# Patient Record
Sex: MALE | Race: WHITE | NOT HISPANIC OR LATINO | Employment: FULL TIME | URBAN - METROPOLITAN AREA
[De-identification: names, ages, dates, MRNs, and addresses within clinical notes are randomized per-mention and may not be internally consistent; named-entity substitution may affect disease eponyms.]

---

## 2018-04-23 ENCOUNTER — OFFICE VISIT (OUTPATIENT)
Dept: INTERNAL MEDICINE | Facility: CLINIC | Age: 58
End: 2018-04-23
Payer: COMMERCIAL

## 2018-04-23 VITALS
HEART RATE: 106 BPM | BODY MASS INDEX: 31.78 KG/M2 | DIASTOLIC BLOOD PRESSURE: 80 MMHG | SYSTOLIC BLOOD PRESSURE: 120 MMHG | TEMPERATURE: 98.6 F | HEIGHT: 71 IN | WEIGHT: 227 LBS | OXYGEN SATURATION: 98 %

## 2018-04-23 DIAGNOSIS — Z00.00 ROUTINE GENERAL MEDICAL EXAMINATION AT A HEALTH CARE FACILITY: Primary | ICD-10-CM

## 2018-04-23 DIAGNOSIS — R53.83 FATIGUE, UNSPECIFIED TYPE: ICD-10-CM

## 2018-04-23 DIAGNOSIS — Z12.5 SCREENING FOR PROSTATE CANCER: ICD-10-CM

## 2018-04-23 DIAGNOSIS — Z12.11 SPECIAL SCREENING FOR MALIGNANT NEOPLASMS, COLON: ICD-10-CM

## 2018-04-23 PROCEDURE — 90471 IMMUNIZATION ADMIN: CPT | Performed by: INTERNAL MEDICINE

## 2018-04-23 PROCEDURE — 99386 PREV VISIT NEW AGE 40-64: CPT | Mod: 25 | Performed by: INTERNAL MEDICINE

## 2018-04-23 PROCEDURE — 90715 TDAP VACCINE 7 YRS/> IM: CPT | Performed by: INTERNAL MEDICINE

## 2018-04-23 NOTE — PROGRESS NOTES
"  SUBJECTIVE:   CC: Miguel Ruiz is an 57 year old male who presents for preventative health visit.     Healthy Habits:    Do you get at least three servings of calcium containing foods daily (dairy, green leafy vegetables, etc.)? {YES/NO, DAIRY INTAKE:622487::\"yes\"}    Amount of exercise or daily activities, outside of work: {AMOUNT EXERCISE:632420}    Problems taking medications regularly {Yes /No default:206511::\"No\"}    Medication side effects: {Yes /No default.:270301::\"No\"}    Have you had an eye exam in the past two years? {YESNOBLANK:001341}    Do you see a dentist twice per year? {YESNOBLANK:829679}    Do you have sleep apnea, excessive snoring or daytime drowsiness?{YESNOBLANK:827038}  {Outside tests to abstract? :776134}     {additional problems to add (Optional):762574}    Today's PHQ-2 Score:   PHQ-2 ( 1999 Pfizer) 4/23/2018   Q1: Little interest or pleasure in doing things 0   Q2: Feeling down, depressed or hopeless 1   PHQ-2 Score 1   Q1: Little interest or pleasure in doing things Not at all   Q2: Feeling down, depressed or hopeless Several days   PHQ-2 Score 1     {PHQ-2 LOOK IN ASSESSMENTS :438031}  Abuse: Current or Past(Physical, Sexual or Emotional)- {YES/NO/NA:446335}  Do you feel safe in your environment - {YES/NO/NA:448285}    Social History   Substance Use Topics     Smoking status: Never Smoker     Smokeless tobacco: Never Used     Alcohol use 0.5 oz/week      Comment: Occasional      If you drink alcohol do you typically have >3 drinks per day or >7 drinks per week? {ETOH :210775}                      Last PSA: No results found for: PSA    Reviewed orders with patient. Reviewed health maintenance and updated orders accordingly - {Yes/No:579193::\"Yes\"}  {Chronicprobdata (Optional):337529}    Reviewed and updated as needed this visit by clinical staff  Tobacco  Allergies  Med Hx  Surg Hx  Fam Hx  Soc Hx        Reviewed and updated as needed this visit by Provider        {HISTORY " "OPTIONS (Optional):500838}    ROS:  {MALE ROS-adult preventive care package:704239::\"C: NEGATIVE for fever, chills, change in weight\",\"I: NEGATIVE for worrisome rashes, moles or lesions\",\"E: NEGATIVE for vision changes or irritation\",\"ENT: NEGATIVE for ear, mouth and throat problems\",\"R: NEGATIVE for significant cough or SOB\",\"CV: NEGATIVE for chest pain, palpitations or peripheral edema\",\"GI: NEGATIVE for nausea, abdominal pain, heartburn, or change in bowel habits\",\" male: negative for dysuria, hematuria, decreased urinary stream, erectile dysfunction, urethral discharge\",\"M: NEGATIVE for significant arthralgias or myalgia\",\"N: NEGATIVE for weakness, dizziness or paresthesias\",\"P: NEGATIVE for changes in mood or affect\"}    OBJECTIVE:   /80  Pulse 106  Temp 98.6  F (37  C) (Oral)  Ht 5' 11\" (1.803 m)  Wt 227 lb (103 kg)  SpO2 98%  BMI 31.66 kg/m2  EXAM:  {Exam Choices:698116}    ASSESSMENT/PLAN:   {Diag Picklist:139318}    COUNSELING:  {MALE COUNSELING MESSAGES:829929::\"Reviewed preventive health counseling, as reflected in patient instructions\"}    {BP Counseling- Complete if BP >= 120/80  (Optional):656448}   reports that he has never smoked. He has never used smokeless tobacco.  {Tobacco Cessation -- Complete if patient is a smoker (Optional):224303}  Estimated body mass index is 31.66 kg/(m^2) as calculated from the following:    Height as of this encounter: 5' 11\" (1.803 m).    Weight as of this encounter: 227 lb (103 kg).   {Weight Management Plan (ACO) Complete if BMI is abnormal-  Ages 18-64  BMI >24.9.  Age 65+ with BMI <23 or >30 (Optional):099941}    Counseling Resources:  ATP IV Guidelines  Pooled Cohorts Equation Calculator  FRAX Risk Assessment  ICSI Preventive Guidelines  Dietary Guidelines for Americans, 2010  USDA's MyPlate  ASA Prophylaxis  Lung CA Screening    Shay G. Nikolov, MD  [facility]"

## 2018-04-23 NOTE — PROGRESS NOTES
SUBJECTIVE:   CC: Miguel Ruiz is an 57 year old male who presents for preventative health visit.     Physical   Annual:     Getting at least 3 servings of Calcium per day::  NO    Bi-annual eye exam::  NO    Dental care twice a year::  NO    Sleep apnea or symptoms of sleep apnea::  Daytime drowsiness and Excessive snoring    Diet::  Regular (no restrictions)    Frequency of exercise::  None    Taking medications regularly::  Yes    Medication side effects::  None    Additional concerns today::  YES              PROBLEMS TO ADD ON...    Concerns for feeling tired, needs to rest, naps.  Has h/o GERD on PPI treatment. symptoms are controlled. No nausea, vomiting, heartburns, bloating.      Today's PHQ-2 Score:   PHQ-2 ( 1999 Pfizer) 4/23/2018   Q1: Little interest or pleasure in doing things 0   Q2: Feeling down, depressed or hopeless 1   PHQ-2 Score 1   Q1: Little interest or pleasure in doing things Not at all   Q2: Feeling down, depressed or hopeless Several days   PHQ-2 Score 1       Abuse: Current or Past(Physical, Sexual or Emotional)- No  Do you feel safe in your environment - Yes    Social History   Substance Use Topics     Smoking status: Never Smoker     Smokeless tobacco: Never Used     Alcohol use 0.5 oz/week      Comment: Occasional     Alcohol Use 4/23/2018   If you drink alcohol do you typically have greater than 3 drinks per day OR greater than 7 drinks per week? No       Last PSA: No results found for: PSA    Reviewed orders with patient. Reviewed health maintenance and updated orders accordingly - Yes  Labs reviewed in EPIC  BP Readings from Last 3 Encounters:   04/23/18 120/80   11/02/12 128/92   08/22/02 110/78    Wt Readings from Last 3 Encounters:   04/23/18 227 lb (103 kg)   08/22/02 204 lb (92.5 kg)   07/26/02 206 lb (93.4 kg)                    Reviewed and updated as needed this visit by clinical staff  Tobacco  Allergies  Meds  Med Hx  Surg Hx  Fam Hx  Soc Hx        Reviewed and  "updated as needed this visit by Provider            Review of Systems  C: NEGATIVE for fever, chills, change in weight  I: NEGATIVE for worrisome rashes, moles or lesions  E: NEGATIVE for vision changes or irritation  ENT: NEGATIVE for ear, mouth and throat problems  R: NEGATIVE for significant cough or SOB  CV: NEGATIVE for chest pain, palpitations or peripheral edema  GI: NEGATIVE for nausea, abdominal pain, heartburn, or change in bowel habits   male: negative for dysuria, hematuria, decreased urinary stream, erectile dysfunction, urethral discharge  M: NEGATIVE for significant arthralgias or myalgia  N: NEGATIVE for weakness, dizziness or paresthesias  P: NEGATIVE for changes in mood or affect    OBJECTIVE:   /80  Pulse 106  Temp 98.6  F (37  C) (Oral)  Ht 5' 11\" (1.803 m)  Wt 227 lb (103 kg)  SpO2 98%  BMI 31.66 kg/m2    Physical Exam  GENERAL: healthy, alert and no distress  EYES: Eyes grossly normal to inspection, PERRL and conjunctivae and sclerae normal  HENT: ear canals and TM's normal, nose and mouth without ulcers or lesions  NECK: no adenopathy, no asymmetry, masses, or scars and thyroid normal to palpation  RESP: lungs clear to auscultation - no rales, rhonchi or wheezes  CV: regular rate and rhythm, normal S1 S2, no S3 or S4, no murmur, click or rub, no peripheral edema and peripheral pulses strong  ABDOMEN: soft, nontender, no hepatosplenomegaly, no masses and bowel sounds normal  MS: no gross musculoskeletal defects noted, no edema  SKIN: no suspicious lesions or rashes  NEURO: Normal strength and tone, mentation intact and speech normal  PSYCH: mentation appears normal, affect normal/bright    ASSESSMENT/PLAN:       ICD-10-CM    1. Routine general medical examination at a health care facility Z00.00 **CBC with platelets FUTURE anytime     **Comprehensive metabolic panel FUTURE anytime     **Prostate spec antigen screen FUTURE anytime     **TSH with free T4 reflex FUTURE anytime     " "**Testosterone Free and Total FUTURE anytime     Lipid panel reflex to direct LDL Fasting     TDAP (ADACEL)   2. Special screening for malignant neoplasms, colon Z12.11 GASTROENTEROLOGY ADULT REF PROCEDURE ONLY   3. Fatigue, unspecified type R53.83 SLEEP EVALUATION & MANAGEMENT REFERRAL - ADULT -[facility] [place]  [phone] (Age 18 and up)     **CBC with platelets FUTURE anytime     **Comprehensive metabolic panel FUTURE anytime     **TSH with free T4 reflex FUTURE anytime     **Testosterone Free and Total FUTURE anytime   4. Screening for prostate cancer Z12.5 **Prostate spec antigen screen FUTURE anytime       COUNSELING:   Reviewed preventive health counseling, as reflected in patient instructions       Regular exercise       Healthy diet/nutrition       Vision screening       Hearing screening       Immunizations    Vaccinated for: TDAP             Colon cancer screening       Prostate cancer screening         reports that he has never smoked. He has never used smokeless tobacco.    Estimated body mass index is 31.66 kg/(m^2) as calculated from the following:    Height as of this encounter: 5' 11\" (1.803 m).    Weight as of this encounter: 227 lb (103 kg).   Weight management plan: Discussed healthy diet and exercise guidelines and patient will follow up in 12 months in clinic to re-evaluate.    Counseling Resources:  ATP IV Guidelines  Pooled Cohorts Equation Calculator  FRAX Risk Assessment  ICSI Preventive Guidelines  Dietary Guidelines for Americans, 2010  USDA's MyPlate  ASA Prophylaxis  Lung CA Screening    Shay Malik MD  [facility]  Answers for HPI/ROS submitted by the patient on 4/23/2018   PHQ-2 Score: 1    "

## 2018-04-23 NOTE — NURSING NOTE
"Chief Complaint   Patient presents with     Rectal Problem     Rectal lump     Physical     Annual Px       Initial /80  Pulse 106  Temp 98.6  F (37  C) (Oral)  Ht 5' 11\" (1.803 m)  Wt 227 lb (103 kg)  SpO2 98%  BMI 31.66 kg/m2 Estimated body mass index is 31.66 kg/(m^2) as calculated from the following:    Height as of this encounter: 5' 11\" (1.803 m).    Weight as of this encounter: 227 lb (103 kg).  Medication Reconciliation: complete   Teagan Lainez MA      "

## 2018-04-23 NOTE — NURSING NOTE
Screening Questionnaire for Adult Immunization    Are you sick today?   No   Do you have allergies to medications, food, a vaccine component or latex?   No   Have you ever had a serious reaction after receiving a vaccination?   No   Do you have a long-term health problem with heart disease, lung disease, asthma, kidney disease, metabolic disease (e.g. diabetes), anemia, or other blood disorder?   No   Do you have cancer, leukemia, HIV/AIDS, or any other immune system problem?   No   In the past 3 months, have you taken medications that affect  your immune system, such as prednisone, other steroids, or anticancer drugs; drugs for the treatment of rheumatoid arthritis, Crohn s disease, or psoriasis; or have you had radiation treatments?   No   Have you had a seizure, or a brain or other nervous system problem?   No   During the past year, have you received a transfusion of blood or blood     products, or been given immune (gamma) globulin or antiviral drug?   No   For women: Are you pregnant or is there a chance you could become        pregnant during the next month?   No   Have you received any vaccinations in the past 4 weeks?   No     Immunization questionnaire answers were all negative.        Per orders of Dr. Malik, injection of Tdap given by Arthur Demarco. Patient instructed to remain in clinic for 15 minutes afterwards, and to report any adverse reaction to me immediately.     Prior to injection verified patient identity using patient's name and date of birth.    Screening performed by Arthur Demarco on 4/23/2018 at 3:33 PM.

## 2018-04-23 NOTE — MR AVS SNAPSHOT
After Visit Summary   4/23/2018    Miguel ROSARIO Ruiz    MRN: [id number]           Patient Information     Date Of Birth          1960        Visit Information        Provider Department      4/23/2018 2:20 PM Shay Malik MD [facility]        Today's Diagnoses     Routine general medical examination at a health care facility    -  1    Special screening for malignant neoplasms, colon        Fatigue, unspecified type        Screening for prostate cancer          Care Instructions      Preventive Health Recommendations  Male Ages 50 - 64    Yearly exam:             See your health care provider every year in order to  o   Review health changes.   o   Discuss preventive care.    o   Review your medicines if your doctor has prescribed any.     Have a cholesterol test every 5 years, or more frequently if you are at risk for high cholesterol/heart disease.     Have a diabetes test (fasting glucose) every three years. If you are at risk for diabetes, you should have this test more often.     Have a colonoscopy at age 50, or have a yearly FIT test (stool test). These exams will check for colon cancer.      Talk with your health care provider about whether or not a prostate cancer screening test (PSA) is right for you.    You should be tested each year for STDs (sexually transmitted diseases), if you re at risk.     Shots: Get a flu shot each year. Get a tetanus shot every 10 years.     Nutrition:    Eat at least 5 servings of fruits and vegetables daily.     Eat whole-grain bread, whole-wheat pasta and brown rice instead of white grains and rice.     Talk to your provider about Calcium and Vitamin D.     Lifestyle    Exercise for at least 150 minutes a week (30 minutes a day, 5 days a week). This will help you control your weight and prevent disease.     Limit alcohol to one drink per day.     No smoking.     Wear sunscreen to prevent skin cancer.     See your dentist every six  months for an exam and cleaning.     See your eye doctor every 1 to 2 years.            Follow-ups after your visit        Additional Services     GASTROENTEROLOGY ADULT REF PROCEDURE ONLY       Last Lab Result: Creatinine (mg/dL)       Date                     Value                 11/01/2012               0.93             ----------  Body mass index is 31.66 kg/(m^2).     Needed:  No  Language:  English    Patient will be contacted to schedule procedure.     Please be aware that coverage of these services is subject to the terms and limitations of your health insurance plan.  Call member services at your health plan with any benefit or coverage questions.  Any procedures must be performed at a [place] facility OR coordinated by your clinic's referral office.    Please bring the following with you to your appointment:    (1) Any X-Rays, CTs or MRIs which have been performed.  Contact the facility where they were done to arrange for  prior to your scheduled appointment.    (2) List of current medications   (3) This referral request   (4) Any documents/labs given to you for this referral            SLEEP EVALUATION & MANAGEMENT REFERRAL - ADULT -[facility]  [phone] (Age 18 and up)       Please be aware that coverage of these services is subject to the terms and limitations of your health insurance plan.  Call member services at your health plan with any benefit or coverage questions.      Please bring the following to your appointment:    >>   List of current medications   >>   This referral request   >>   Any documents/labs given to you for this referral                      Future tests that were ordered for you today     Open Future Orders        Priority Expected Expires Ordered    **Comprehensive metabolic panel FUTURE anytime Routine 4/23/2018 4/23/2019 4/23/2018    **Prostate spec antigen screen FUTURE anytime Routine 4/23/2018 4/23/2019 4/23/2018    **TSH with  "free T4 reflex FUTURE anytime Routine 2018    **Testosterone Free and Total FUTURE anytime Routine 2018    Lipid panel reflex to direct LDL Fasting Routine 2018    **CBC with platelets FUTURE anytime Routine 2018    SLEEP EVALUATION & MANAGEMENT REFERRAL - ADULT -[facility]  [phone] (Age 18 and up) Routine  2019            Who to contact     If you have questions or need follow up information about today's clinic visit or your schedule please contact [facility] directly at [phone].  Normal or non-critical lab and imaging results will be communicated to you by MyChart, letter or phone within 4 business days after the clinic has received the results. If you do not hear from us within 7 days, please contact the clinic through MyChart or phone. If you have a critical or abnormal lab result, we will notify you by phone as soon as possible.  Submit refill requests through [employer] or call your pharmacy and they will forward the refill request to us. Please allow 3 business days for your refill to be completed.          Additional Information About Your Visit        [employer] Information     [employer] lets you send messages to your doctor, view your test results, renew your prescriptions, schedule appointments and more. To sign up, go to [email or web address] . Click on \"Log in\" on the left side of the screen, which will take you to the Welcome page. Then click on \"Sign up Now\" on the right side of the page.     You will be asked to enter the access code listed below, as well as some personal information. Please follow the directions to create your username and password.     Your access code is: [email or web address]  Expires: 2018  3:22 PM     Your access code will  in 90 days. If you need help or a new code, please call your [facility] or " "[phone].        Care EveryWhere ID     This is your Care EveryWhere ID. This could be used by other organizations to access your [place] medical records  [id number]        Your Vitals Were     Pulse Temperature Height Pulse Oximetry BMI (Body Mass Index)       106 98.6  F (37  C) (Oral) 5' 11\" (1.803 m) 98% 31.66 kg/m2        Blood Pressure from Last 3 Encounters:   04/23/18 120/80   11/02/12 128/92   08/22/02 110/78    Weight from Last 3 Encounters:   04/23/18 227 lb (103 kg)   08/22/02 204 lb (92.5 kg)   07/26/02 206 lb (93.4 kg)              We Performed the Following     GASTROENTEROLOGY ADULT REF PROCEDURE ONLY     TDAP (ADACEL)        Primary Care Provider Fax #    Physician No Ref-Primary [phone]       No address on file        Equal Access to Services     [facility] HospitalJAIRON : Hadii ramu Nava, waaxda vane, qaybta kaalmada carlota, aditi chase . So [facility] [phone].    ATENCIÓN: Si habla español, tiene a parekh disposición servicios gratuitos de asistencia lingüística. Llame al [phone].    We comply with applicable federal civil rights laws and [place] laws. We do not discriminate on the basis of race, color, national origin, age, disability, sex, sexual orientation, or gender identity.            Thank you!     Thank you for choosing [facility]  for your care. Our goal is always to provide you with excellent care. Hearing back from our patients is one way we can continue to improve our services. Please take a few minutes to complete the written survey that you may receive in the mail after your visit with us. Thank you!             Your Updated Medication List - Protect others around you: Learn how to safely use, store and throw away your medicines at www.disposemymeds.org.          This list is accurate as of 4/23/18  3:25 PM.  Always use your most recent med list.                   Brand Name Dispense Instructions for use Diagnosis "    PRILOSEC PO      Take by mouth every morning OTC

## 2018-04-27 DIAGNOSIS — Z12.5 SCREENING FOR PROSTATE CANCER: ICD-10-CM

## 2018-04-27 DIAGNOSIS — Z00.00 ROUTINE GENERAL MEDICAL EXAMINATION AT A HEALTH CARE FACILITY: ICD-10-CM

## 2018-04-27 DIAGNOSIS — R53.83 FATIGUE, UNSPECIFIED TYPE: ICD-10-CM

## 2018-04-27 LAB
ALBUMIN SERPL-MCNC: 4.3 G/DL (ref 3.4–5)
ALP SERPL-CCNC: 52 U/L (ref 40–150)
ALT SERPL W P-5'-P-CCNC: 34 U/L (ref 0–70)
ANION GAP SERPL CALCULATED.3IONS-SCNC: 8 MMOL/L (ref 3–14)
AST SERPL W P-5'-P-CCNC: 14 U/L (ref 0–45)
BILIRUB SERPL-MCNC: 0.7 MG/DL (ref 0.2–1.3)
BUN SERPL-MCNC: 12 MG/DL (ref 7–30)
CALCIUM SERPL-MCNC: 9.2 MG/DL (ref 8.5–10.1)
CHLORIDE SERPL-SCNC: 107 MMOL/L (ref 94–109)
CHOLEST SERPL-MCNC: 222 MG/DL
CO2 SERPL-SCNC: 23 MMOL/L (ref 20–32)
CREAT SERPL-MCNC: 1.01 MG/DL (ref 0.66–1.25)
ERYTHROCYTE [DISTWIDTH] IN BLOOD BY AUTOMATED COUNT: 12.8 % (ref 10–15)
GFR SERPL CREATININE-BSD FRML MDRD: 77 ML/MIN/1.7M2
GLUCOSE SERPL-MCNC: 102 MG/DL (ref 70–99)
HCT VFR BLD AUTO: 45.1 % (ref 40–53)
HDLC SERPL-MCNC: 33 MG/DL
HGB BLD-MCNC: 15.6 G/DL (ref 13.3–17.7)
LDLC SERPL CALC-MCNC: 168 MG/DL
MCH RBC QN AUTO: 31.4 PG (ref 26.5–33)
MCHC RBC AUTO-ENTMCNC: 34.6 G/DL (ref 31.5–36.5)
MCV RBC AUTO: 91 FL (ref 78–100)
NONHDLC SERPL-MCNC: 189 MG/DL
PLATELET # BLD AUTO: 271 10E9/L (ref 150–450)
POTASSIUM SERPL-SCNC: 4.5 MMOL/L (ref 3.4–5.3)
PROT SERPL-MCNC: 8 G/DL (ref 6.8–8.8)
PSA SERPL-ACNC: 1.81 UG/L (ref 0–4)
RBC # BLD AUTO: 4.97 10E12/L (ref 4.4–5.9)
SODIUM SERPL-SCNC: 138 MMOL/L (ref 133–144)
TRIGL SERPL-MCNC: 107 MG/DL
TSH SERPL DL<=0.005 MIU/L-ACNC: 2.28 MU/L (ref 0.4–4)
WBC # BLD AUTO: 6.2 10E9/L (ref 4–11)

## 2018-04-27 PROCEDURE — 36415 COLL VENOUS BLD VENIPUNCTURE: CPT | Performed by: INTERNAL MEDICINE

## 2018-04-27 PROCEDURE — 80061 LIPID PANEL: CPT | Performed by: INTERNAL MEDICINE

## 2018-04-27 PROCEDURE — 84403 ASSAY OF TOTAL TESTOSTERONE: CPT | Performed by: INTERNAL MEDICINE

## 2018-04-27 PROCEDURE — G0103 PSA SCREENING: HCPCS | Performed by: INTERNAL MEDICINE

## 2018-04-27 PROCEDURE — 80053 COMPREHEN METABOLIC PANEL: CPT | Performed by: INTERNAL MEDICINE

## 2018-04-27 PROCEDURE — 84270 ASSAY OF SEX HORMONE GLOBUL: CPT | Performed by: INTERNAL MEDICINE

## 2018-04-27 PROCEDURE — 84443 ASSAY THYROID STIM HORMONE: CPT | Performed by: INTERNAL MEDICINE

## 2018-04-27 PROCEDURE — 85027 COMPLETE CBC AUTOMATED: CPT | Performed by: INTERNAL MEDICINE

## 2018-05-01 LAB
SHBG SERPL-SCNC: 33 NMOL/L (ref 11–80)
TESTOST FREE SERPL-MCNC: 10.73 NG/DL (ref 4.7–24.4)
TESTOST SERPL-MCNC: 510 NG/DL (ref 240–950)

## 2018-05-10 ENCOUNTER — TELEPHONE (OUTPATIENT)
Dept: INTERNAL MEDICINE | Facility: CLINIC | Age: 58
End: 2018-05-10

## 2018-05-10 DIAGNOSIS — E78.2 MIXED HYPERLIPIDEMIA: ICD-10-CM

## 2018-05-10 DIAGNOSIS — R73.09 ELEVATED GLUCOSE: Primary | ICD-10-CM

## 2018-05-10 NOTE — TELEPHONE ENCOUNTER
Pt calling asking for lab results from 4-27-18.  Advised him of Dr. Malik's result note message.    Future lab orders entered into chart.

## 2018-07-24 ENCOUNTER — TELEPHONE (OUTPATIENT)
Dept: INTERNAL MEDICINE | Facility: CLINIC | Age: 58
End: 2018-07-24

## 2018-07-24 NOTE — LETTER
[facility]  [address], [address]  [place] [address]  [phone]        July 24, 2018    Migueltavo Ruiz  [address] [place] [address]            Dear Mr. Miguel Ruiz:  At [facility] we care about your health and well-being. In order to ensure we are providing the best quality care, we have reviewed your chart and see that you are due for:    1. Colonoscopy      If you have already had one or all of the above screening tests at another facility, please contact our office to update your chart at [phone].    GI : [phone] to schedule Colonoscopy        Sincerely,        [facility] [place]

## 2018-07-24 NOTE — TELEPHONE ENCOUNTER
Panel Management Review      Patient has the following on his problem list: None      Composite cancer screening  Chart review shows that this patient is due/due soon for the following Colonoscopy  Summary:    Patient is due/failing the following:   COLONOSCOPY    Action needed:   None    Type of outreach:    Sent letter.    Questions for provider review:    None                                                                                                                                    Teagan Lainez MA       Chart routed to none .

## 2021-06-27 ENCOUNTER — NURSE TRIAGE (OUTPATIENT)
Dept: NURSING | Facility: CLINIC | Age: 61
End: 2021-06-27

## 2021-06-27 NOTE — TELEPHONE ENCOUNTER
"Pt reports that he developed what appears to be a cyst at his right axilla, noticed yesterday.  The lump is dime-sized with surrounding redness (may be red due to the family trying to \"squeeze the pus out.\"  Pt is afebrile, and states that the pain is very mild, 1/10. Pain worsens a little when he plays golf.    Per protocol, pt advised to see his PCP within 24 hours. Pt does not have a PCP, and cannot get into the clinic tomorrow, as he will be out of town for work.  Pt advised to go to urgent care for evaluation.  Yenny Yee RN 06/27/21 4:21 PM  [facility] Nurse Advisor        Reason for Disposition    [1] Swelling is painful to touch AND [2] no fever    Additional Information    Negative: Small growth, spot, bump, or pigmented area of skin (e.g., moles, skin tags, wart, melanoma, skin cancer)    Negative: Inguinal hernia previously diagnosed by a physician    Negative: Followed a skin injury    Negative: Follows an insect bite    Negative: Swelling of lymph node suspected    Negative: Swelling of vaccination site    Negative: Swelling of tongue    Negative: Swelling of lip    Negative: Swelling of eye    Negative: Swelling of entire face    Negative: Swelling of scrotum    Negative: Swelling of labia    Negative: Swelling of surgical incision    Negative: Swelling of ankle joint    Negative: Swelling of elbow joint    Negative: Swelling of knee joint    Negative: Swelling with a skin rash    Negative: Patient sounds very sick or weak to the triager    Negative: SEVERE pain (e.g., excruciating)    Negative: [1] Swelling is painful to touch AND [2] fever    Negative: [1] Swelling is red AND [2] fever    Negative: [1] Swelling is red AND [2] size > 2 inches (5.0 cm) (Exception: itchy area of skin)    Negative: [1] Swelling of groin (inguinal area) AND [2] painful    Protocols used: SKIN LUMP OR LOCALIZED SWELLING-A-AH    COVID 19 Nurse Triage Plan/Patient Instructions    Please be aware that novel " coronavirus (COVID-19) may be circulating in the community. If you develop symptoms such as fever, cough, or SOB or if you have concerns about the presence of another infection including coronavirus (COVID-19), please contact your health care provider or visit [email or web address].     Disposition/Instructions    In-Person Visit with provider recommended. Reference Visit Selection Guide.    Thank you for taking steps to prevent the spread of this virus.  o Limit your contact with others.  o Wear a simple mask to cover your cough.  o Wash your hands well and often.    Resources    [facility]: About COVID-19: [email or web address]    CDC: What to Do If You're Sick: www.cdc.gov/coronavirus/2019-ncov/about/steps-when-sick.html    CDC: Ending Home Isolation: www.cdc.gov/coronavirus/2019-ncov/hcp/disposition-in-home-patients.html     CDC: Caring for Someone: www.cdc.gov/coronavirus/2019-ncov/if-you-are-sick/care-for-someone.html     [facility]: Interim Guidance for Hospital Discharge to Home: www.health.[facility].[place]/diseases/coronavirus/hcp/hospdischarge.pdf    [facility] clinical trials (COVID-19 research studies): [email or web address]     Below are the COVID-19 hotlines at the [place] [facility] ([facility]). Interpreters are available.   o For health questions: Call 054-830-8204 or 1-338.650.3100 (7 a.m. to 7 p.m.)  o For questions about schools and childcare: Call 258-815-5000 or 1-578.605.7371 (7 a.m. to 7 p.m.)

## 2024-04-22 ENCOUNTER — NURSE TRIAGE (OUTPATIENT)
Dept: CARDIOLOGY | Facility: CLINIC | Age: 64
End: 2024-04-22
Payer: COMMERCIAL

## 2024-04-22 ENCOUNTER — HOSPITAL ENCOUNTER (EMERGENCY)
Facility: CLINIC | Age: 64
Discharge: HOME | End: 2024-04-22
Attending: EMERGENCY MEDICINE | Admitting: EMERGENCY MEDICINE
Payer: COMMERCIAL

## 2024-04-22 ENCOUNTER — APPOINTMENT (OUTPATIENT)
Dept: GENERAL RADIOLOGY | Facility: CLINIC | Age: 64
End: 2024-04-22
Attending: EMERGENCY MEDICINE
Payer: COMMERCIAL

## 2024-04-22 VITALS
WEIGHT: 235.89 LBS | OXYGEN SATURATION: 98 % | SYSTOLIC BLOOD PRESSURE: 137 MMHG | HEART RATE: 62 BPM | RESPIRATION RATE: 18 BRPM | HEIGHT: 71 IN | BODY MASS INDEX: 33.02 KG/M2 | TEMPERATURE: 97.5 F | DIASTOLIC BLOOD PRESSURE: 88 MMHG

## 2024-04-22 DIAGNOSIS — I25.118 ATHEROSCLEROSIS OF NATIVE CORONARY ARTERY OF NATIVE HEART WITH STABLE ANGINA PECTORIS (H): ICD-10-CM

## 2024-04-22 LAB
ALBUMIN SERPL BCG-MCNC: 4.8 G/DL (ref 3.5–5.2)
ALP SERPL-CCNC: 57 U/L (ref 40–150)
ALT SERPL W P-5'-P-CCNC: 44 U/L (ref 0–70)
ANION GAP SERPL CALCULATED.3IONS-SCNC: 14 MMOL/L (ref 7–15)
AST SERPL W P-5'-P-CCNC: 24 U/L (ref 0–45)
ATRIAL RATE - MUSE: 66 BPM
BASOPHILS # BLD AUTO: 0.1 10E3/UL (ref 0–0.2)
BASOPHILS NFR BLD AUTO: 1 %
BILIRUB SERPL-MCNC: 0.7 MG/DL
BUN SERPL-MCNC: 12.8 MG/DL (ref 8–23)
CALCIUM SERPL-MCNC: 9.2 MG/DL (ref 8.8–10.2)
CHLORIDE SERPL-SCNC: 101 MMOL/L (ref 98–107)
CREAT SERPL-MCNC: 0.95 MG/DL (ref 0.67–1.17)
D DIMER PPP FEU-MCNC: <0.27 UG/ML FEU (ref 0–0.5)
DEPRECATED HCO3 PLAS-SCNC: 21 MMOL/L (ref 22–29)
DIASTOLIC BLOOD PRESSURE - MUSE: NORMAL MMHG
EGFRCR SERPLBLD CKD-EPI 2021: >90 ML/MIN/1.73M2
EOSINOPHIL # BLD AUTO: 0.3 10E3/UL (ref 0–0.7)
EOSINOPHIL NFR BLD AUTO: 5 %
ERYTHROCYTE [DISTWIDTH] IN BLOOD BY AUTOMATED COUNT: 13.2 % (ref 10–15)
GLUCOSE SERPL-MCNC: 99 MG/DL (ref 70–99)
HCT VFR BLD AUTO: 44.3 % (ref 40–53)
HGB BLD-MCNC: 15.6 G/DL (ref 13.3–17.7)
HOLD SPECIMEN: NORMAL
HOLD SPECIMEN: NORMAL
IMM GRANULOCYTES # BLD: 0 10E3/UL
IMM GRANULOCYTES NFR BLD: 0 %
INTERPRETATION ECG - MUSE: NORMAL
LYMPHOCYTES # BLD AUTO: 2.5 10E3/UL (ref 0.8–5.3)
LYMPHOCYTES NFR BLD AUTO: 37 %
MCH RBC QN AUTO: 32.4 PG (ref 26.5–33)
MCHC RBC AUTO-ENTMCNC: 35.2 G/DL (ref 31.5–36.5)
MCV RBC AUTO: 92 FL (ref 78–100)
MONOCYTES # BLD AUTO: 0.8 10E3/UL (ref 0–1.3)
MONOCYTES NFR BLD AUTO: 12 %
NEUTROPHILS # BLD AUTO: 3 10E3/UL (ref 1.6–8.3)
NEUTROPHILS NFR BLD AUTO: 45 %
NRBC # BLD AUTO: 0 10E3/UL
NRBC BLD AUTO-RTO: 0 /100
P AXIS - MUSE: -9 DEGREES
PLATELET # BLD AUTO: 261 10E3/UL (ref 150–450)
POTASSIUM SERPL-SCNC: 4 MMOL/L (ref 3.4–5.3)
PR INTERVAL - MUSE: 182 MS
PROT SERPL-MCNC: 7.7 G/DL (ref 6.4–8.3)
QRS DURATION - MUSE: 102 MS
QT - MUSE: 410 MS
QTC - MUSE: 429 MS
R AXIS - MUSE: 1 DEGREES
RBC # BLD AUTO: 4.81 10E6/UL (ref 4.4–5.9)
SODIUM SERPL-SCNC: 136 MMOL/L (ref 135–145)
SYSTOLIC BLOOD PRESSURE - MUSE: NORMAL MMHG
T AXIS - MUSE: 4 DEGREES
TROPONIN T SERPL HS-MCNC: 6 NG/L
TROPONIN T SERPL HS-MCNC: 7 NG/L
VENTRICULAR RATE- MUSE: 66 BPM
WBC # BLD AUTO: 6.6 10E3/UL (ref 4–11)

## 2024-04-22 PROCEDURE — 80053 COMPREHEN METABOLIC PANEL: CPT | Performed by: EMERGENCY MEDICINE

## 2024-04-22 PROCEDURE — 84484 ASSAY OF TROPONIN QUANT: CPT | Performed by: EMERGENCY MEDICINE

## 2024-04-22 PROCEDURE — 71046 X-RAY EXAM CHEST 2 VIEWS: CPT

## 2024-04-22 PROCEDURE — 85379 FIBRIN DEGRADATION QUANT: CPT | Performed by: EMERGENCY MEDICINE

## 2024-04-22 PROCEDURE — 36415 COLL VENOUS BLD VENIPUNCTURE: CPT | Performed by: STUDENT IN AN ORGANIZED HEALTH CARE EDUCATION/TRAINING PROGRAM

## 2024-04-22 PROCEDURE — 93005 ELECTROCARDIOGRAM TRACING: CPT

## 2024-04-22 PROCEDURE — 85025 COMPLETE CBC W/AUTO DIFF WBC: CPT | Performed by: EMERGENCY MEDICINE

## 2024-04-22 PROCEDURE — 36415 COLL VENOUS BLD VENIPUNCTURE: CPT | Performed by: EMERGENCY MEDICINE

## 2024-04-22 PROCEDURE — 99285 EMERGENCY DEPT VISIT HI MDM: CPT | Mod: 25

## 2024-04-22 ASSESSMENT — ACTIVITIES OF DAILY LIVING (ADL): ADLS_ACUITY_SCORE: 33

## 2024-04-22 ASSESSMENT — COLUMBIA-SUICIDE SEVERITY RATING SCALE - C-SSRS
1. IN THE PAST MONTH, HAVE YOU WISHED YOU WERE DEAD OR WISHED YOU COULD GO TO SLEEP AND NOT WAKE UP?: NO
2. HAVE YOU ACTUALLY HAD ANY THOUGHTS OF KILLING YOURSELF IN THE PAST MONTH?: NO
6. HAVE YOU EVER DONE ANYTHING, STARTED TO DO ANYTHING, OR PREPARED TO DO ANYTHING TO END YOUR LIFE?: NO

## 2024-04-22 NOTE — ED PROVIDER NOTES
"  History     Chief Complaint:  Chest Pain and Shortness of Breath     The history is provided by the patient.      Miguel Ruiz is a 63 year old male with history of mixed hyperlipidemia on fish oil and GERD who presents to the ED with his wife for evaluation of chest pain and shortness of breath. Miguel reports a couple of months of intermittent chest pain with exertion, but the pain sometimes onsets at rest. He also endorses worsening shortness of breath and dyspnea on exertion. On Saturday, 2 days ago, the patient was exerting himself while doing yard work and developed chest pain, shortness of breath, and left upper and lower extremity pain. He rested the following morning, then completed more yard work and the chest pain and shortness of breath came back again. He took many more breaks than normal for him. He checked his O2 on a pulse oximeter and measured 92%, which improved with deep breathing and rest. The chest pain lingered longer, for a total of about 1 hour. Also notes recent posterior left lower extremity pain.     Family history of MI in patient's father, uncle, and grandfather, but notes alcohol likely contributed to his grandfather's condition. Another uncle had a coronary bypass. Patient denies personal history of hypertension, diabetes mellitus, or blood clots. No recent surgeries.     Independent Historian:   None - Patient Only    Review of External Notes:       Medications:    Omeprazole    Past Medical History:    Mixed hyperlipidemia  GERD  Right lumbar radiculopathy  Diverticulosis of large intestine, ruptured    Past Surgical History:    Cervical diskectomy  Nasal septum surgery  Hemicolectomy    Physical Exam   Patient Vitals for the past 24 hrs:   BP Temp Temp src Pulse Resp SpO2 Height Weight   04/22/24 1843 137/88 -- -- 62 18 98 % -- --   04/22/24 1409 (!) 156/101 -- -- 74 18 98 % -- --   04/22/24 1406 -- 97.5  F (36.4  C) Oral -- 18 -- 1.803 m (5' 11\") 107 kg (235 lb 14.3 oz) "     Physical Exam  Vitals reviewed.   Constitutional:       Appearance: He is obese.   Cardiovascular:      Rate and Rhythm: Normal rate and regular rhythm.      Heart sounds: Normal heart sounds.   Pulmonary:      Effort: Pulmonary effort is normal.      Breath sounds: Normal breath sounds.   Abdominal:      General: Bowel sounds are normal.      Palpations: Abdomen is soft.   Musculoskeletal:         General: Normal range of motion.   Skin:     General: Skin is warm.      Capillary Refill: Capillary refill takes less than 2 seconds.   Neurological:      General: No focal deficit present.      Mental Status: He is alert and oriented to person, place, and time.      Motor: No weakness.   Psychiatric:         Mood and Affect: Mood normal.         Emergency Department Course   ECG  ECG taken at 1421, ECG read at 1424  Normal sinus rhythm  Normal ECG   Rate 66 bpm. MI interval 182 ms. QRS duration 102 ms. QT/QTc 410/429 ms. P-R-T axes -9 1 4.     Imaging:  Chest XR,  PA & LAT   Final Result   IMPRESSION: Negative chest.         Report per radiology    Laboratory:  Labs Ordered and Resulted from Time of ED Arrival to Time of ED Departure   COMPREHENSIVE METABOLIC PANEL - Abnormal       Result Value    Sodium 136      Potassium 4.0      Carbon Dioxide (CO2) 21 (*)     Anion Gap 14      Urea Nitrogen 12.8      Creatinine 0.95      GFR Estimate >90      Calcium 9.2      Chloride 101      Glucose 99      Alkaline Phosphatase 57      AST 24      ALT 44      Protein Total 7.7      Albumin 4.8      Bilirubin Total 0.7     TROPONIN T, HIGH SENSITIVITY - Normal    Troponin T, High Sensitivity 7     TROPONIN T, HIGH SENSITIVITY - Normal    Troponin T, High Sensitivity 6     D DIMER QUANTITATIVE - Normal    D-Dimer Quantitative <0.27     CBC WITH PLATELETS AND DIFFERENTIAL    WBC Count 6.6      RBC Count 4.81      Hemoglobin 15.6      Hematocrit 44.3      MCV 92      MCH 32.4      MCHC 35.2      RDW 13.2      Platelet Count 261       % Neutrophils 45      % Lymphocytes 37      % Monocytes 12      % Eosinophils 5      % Basophils 1      % Immature Granulocytes 0      NRBCs per 100 WBC 0      Absolute Neutrophils 3.0      Absolute Lymphocytes 2.5      Absolute Monocytes 0.8      Absolute Eosinophils 0.3      Absolute Basophils 0.1      Absolute Immature Granulocytes 0.0      Absolute NRBCs 0.0        Emergency Department Course & Assessments:  Assessments/Consultations/Discussion of Management or Tests:  ED Course as of 04/22/24 2034 Mon Apr 22, 2024 1737 I obtained history and examined the patient as noted above.    1835 I rechecked the patient and explained findings. Patient discharged home with instructions regarding supportive care, medications, and reasons to return. The importance of close follow-up was reviewed.        Interventions:  Medications - No data to display     Independent Interpretation (X-rays, CTs, rhythm strip):  None    Social Determinants of Health affecting care:   None    Disposition:  The patient was discharged to home.     Impression & Plan    CMS Diagnoses: None    MIPS (If applicable):  N/A    Medical Decision Making:  Patient presents with atypical chest tightness and shortness of breath associated with exertion.  Extensive family history of coronary disease patient had a clear history.  Episodes of chest pain at rest but brief and resolved resolving on their own.  Extensive workup entertained due to exertional dyspnea.  D-dimer negative EKG troponin negative hemoglobin normal and chest x-ray normal.  Vital signs also normal normal sats and normal respiratory rate no murmurs also auscultated care was discussed with the patient did consider admission to the hospital but due to like at times he been symptomatic long boarding and wait times care was discussed with the patient did refer to cardiology as high clinical suspicions for coronary artery disease possibly new onset could be symptoms of angina may require  stress test or further workup as indicated by cardiology.  Patient asked to return with escalating or prolonged unrelenting symptoms and was discharged home in stable condition    Diagnosis:    ICD-10-CM    1. Atherosclerosis of native coronary artery of native heart with stable angina pectoris (H24)  I25.118 Adult Cardiology [facility] Referral          Scribe Disclosure:  Jesus OBANDO Hailie, am serving as a scribe at 5:36 PM on 4/22/2024 to document services personally performed by Ronald Haji MD based on my observations and the provider's statements to me.  4/22/2024   Ronald Haji MD Goodman, Brian Samuel, MD  04/23/24 1533

## 2024-04-22 NOTE — DISCHARGE INSTRUCTIONS
We have had a cardiology workup that is been negative your symptoms of chest discomfort worse with activity or exertion sound a little bit like angina.  We have referred you to the cardiologist if he is CA even more significant reduction in exertional capacity or severe chest pain at rest return to the emergency room for reassessment.  Thanks for your patience today.

## 2024-04-22 NOTE — TELEPHONE ENCOUNTER
"Received a call from the call center to discuss chest pain.  Patient is not established with cardiology.  Spoke with Miguel, who says he is calling for recommendations regarding some symptoms he has been having. [place], and was having central chest pains, shortness of breath, feeling tired quicker. He says Saturday night he was having a lot of pain including his left arm, and also left hip pain.  On Sunday, he was working again and had these same symptoms, and had to stop and rest. His wife had a pulse oximeter that was reading 92-96%.  He says these symptoms started in the last couple of months, but have increased in frequency and have been \"getting worse\".   He says he is having chest tightness currently, rating it #2-3 in severity, but also describes the chest pain as heavy with numbness and burning, and says he has to focus hard on his breathing. He denies arm pain currently, nausea, sweating, dizziness.   He says he had a stress test through [employer] about 12 years ago at the [place] location.  He says he does take prilosec for reflux issues.  Advised it is recommended to go to ED for an evaluation.  He verbalized agreement and understanding.    1. LOCATION: \"Where does it hurt?\" Central chest  2. RADIATION: \"Does the pain go anywhere else?\" (e.g., into neck, jaw, arms, back) arm  3. ONSET: \"When did the chest pain begin?\" (Minutes, hours or days) this weekend  4. PATTERN: \"Does the pain come and go, or has it been constant since it started?\" \"Does it get worse with exertion?\" Worse with exertion  5. DURATION: \"How long does it last\" (e.g., seconds, minutes, hours)  6. SEVERITY: \"How bad is the pain?\" (e.g., Scale 1-10; mild, moderate, or severe) Moderate  - MILD (1-3): doesn't interfere with normal activities  - MODERATE (4-7): interferes with normal activities or awakens from sleep  - SEVERE (8-10): excruciating pain, unable to do any normal " "activities  7. CARDIAC RISK FACTORS: \"Do you have any history of heart problems or risk factors for heart disease?\" (e.g., angina, prior heart attack; diabetes, high blood pressure, high cholesterol, smoker, or strong family history of heart disease) mixed hyperlipidemia  8. PULMONARY RISK FACTORS: \"Do you have any history of lung disease?\" (e.g., blood clots in lung, asthma, emphysema, birth control pills)  9. CAUSE: \"What do you think is causing the chest pain?\"  10. OTHER SYMPTOMS: \"Do you have any other symptoms?\" (e.g., dizziness, nausea, vomiting, sweating, fever, difficulty breathing, cough) shortness of breath, fatigue  11. PREGNANCY: \"Is there any chance you are pregnant?\" \"When was your last menstrual period?  Reason for Disposition   Difficulty breathing   Chest pain or 'angina' comes and goes and is happening more often (increasing in frequency) or getting worse (increasing in severity) (Exception: Chest pains that last only a few seconds.)    Protocols used: Chest Pain-A-OH    "

## 2024-04-22 NOTE — ED TRIAGE NOTES
Pt. Presents to ED with complains of chest pain and SOB that worsened this weekend. Reports these symptoms have been intermittent for awhile. Also reports fatigue but denies fever, cough, sore throat or runny nose. HTN, OVSS on RA. Denies cardiac conditions. Reports he takes Prilosec for reflux. Pt. Reports this type of pain is heaviness and tightness. Reports it radiated to his L arm on Saturday. Reports pain worsens with physical activity.

## 2024-04-26 ENCOUNTER — OFFICE VISIT (OUTPATIENT)
Dept: CARDIOLOGY | Facility: CLINIC | Age: 64
End: 2024-04-26
Attending: EMERGENCY MEDICINE
Payer: COMMERCIAL

## 2024-04-26 VITALS
WEIGHT: 238.6 LBS | SYSTOLIC BLOOD PRESSURE: 122 MMHG | DIASTOLIC BLOOD PRESSURE: 84 MMHG | BODY MASS INDEX: 33.28 KG/M2 | OXYGEN SATURATION: 97 % | HEART RATE: 72 BPM

## 2024-04-26 DIAGNOSIS — I25.118 ATHEROSCLEROSIS OF NATIVE CORONARY ARTERY OF NATIVE HEART WITH STABLE ANGINA PECTORIS (H): ICD-10-CM

## 2024-04-26 PROCEDURE — 99203 OFFICE O/P NEW LOW 30 MIN: CPT | Performed by: INTERNAL MEDICINE

## 2024-04-26 NOTE — PROGRESS NOTES
HPI and Plan:   Pleasure to see this 63-year-old gentleman accompanied by his wife.  He was recently discharged from the ER with chest discomfort and was told to follow-up with a cardiologist    He is not diabetic hypertensive not as he smoke drink or abuse drugs.  Family history is notable for father having coronary artery disease in his 60s though I reassured them that this does not technically constitute a positive family history.    Since beginning winter he has had chest discomfort.  It is a constant sensation in his chest with an occasional popping feeling.  It is not worse on exertion.  He does feel more winded and occasionally dizzy.  No nausea or vomiting associate with this symptom.    Several days ago he experienced profound fatigue after working in the yard for 5 hours.  Does not workout on a regular basis.    Cardiac examination is normal.  Excellent blood pressure.    Review of the test done in the emergency room from just few days ago.    Troponins x 2 were completely negative.  EKG shows fractionated QRS in lead 3 but overall within normal limits  Other labs including chest x-ray also normal.    This is a gentleman with mild cardiac risk factors and atypical symptoms.  Chest pain is probably noncardiac.  He admits to being under a lot of stress at this time both in his professional and personal life.    Will check stress echocardiogram.  If this is normal I do not think further cardiac workup will be necessary.    Orders Placed This Encounter   Procedures    Exercise Stress Echocardiogram       Orders Placed This Encounter   Medications    Multiple Vitamin (MULTIVITAMIN ADULT PO)    Omega-3 Fatty Acids (FISH OIL PO)     Sig: Take a double dose daily       Encounter Diagnosis   Name Primary?    Atherosclerosis of native coronary artery of native heart with stable angina pectoris (H24)        CURRENT MEDICATIONS:  Current Outpatient Medications   Medication Sig Dispense Refill    Multiple Vitamin  (MULTIVITAMIN ADULT PO)       Omega-3 Fatty Acids (FISH OIL PO) Take a double dose daily      Omeprazole (PRILOSEC PO) Take by mouth every morning OTC         ALLERGIES     Allergies   Allergen Reactions    No Known Allergies        PAST MEDICAL HISTORY:  Past Medical History:   Diagnosis Date    Esophageal reflux     Mixed hyperlipidemia     Obesity, unspecified        PAST SURGICAL HISTORY:  Past Surgical History:   Procedure Laterality Date    GI SURGERY      HC RECONSTR NOSE+MARCELA SEPTAL REPAIR      HEMICOLECTOMY, RT/LT Left 2006    neck fusion   1996    ZZC AFF NECK/CHEST PROCEDURE UNLISTED  1997    Neck fused       FAMILY HISTORY:  Family History   Problem Relation Age of Onset    Ovarian Cancer Mother     Heart Disease Father         MI at age 65    Esophageal Cancer Father     Cancer Sister         pancreas at age 35    Cancer Paternal Aunt         breast    Diabetes Paternal Aunt     Diabetes Maternal Aunt     Diabetes Maternal Uncle     Heart Disease Paternal Uncle         multible uncles    Alzheimer Disease No family hx of        SOCIAL HISTORY:  Social History     Socioeconomic History    Marital status:      Spouse name: Latia    Number of children: 5    Years of education: 16    Highest education level: None   Occupational History    Occupation: Sales     Comment: Self    [employer]   Tobacco Use    Smoking status: Never    Smokeless tobacco: Never   Substance and Sexual Activity    Alcohol use: Yes     Alcohol/week: 0.8 standard drinks of alcohol     Comment: Occasional    Drug use: No    Sexual activity: Yes     Partners: Female   Other Topics Concern    Exercise Yes    Seat Belt Yes    Self-Exams No       Review of Systems:  Skin:  not assessed     Eyes:  not assessed    ENT:  not assessed    Respiratory:  Negative    Cardiovascular:    Positive for;chest pain;fatigue;lightheadedness  Gastroenterology: not assessed    Genitourinary:  not assessed    Musculoskeletal:  not assessed    Neurologic:   not assessed    Psychiatric:  not assessed    Heme/Lymph/Imm:  not assessed    Endocrine:  not assessed      Physical Exam:  Vitals: /84 (BP Location: Right arm, Patient Position: Sitting, Cuff Size: Adult Large)   Pulse 72   Wt 108.2 kg (238 lb 9.6 oz)   SpO2 97%   BMI 33.28 kg/m      Constitutional:  cooperative, alert and oriented, well developed, well nourished, in no acute distress        Skin:  warm and dry to the touch, no apparent skin lesions or masses noted          Head:  normocephalic, no masses or lesions        Eyes:  pupils equal and round, conjunctivae and lids unremarkable, sclera white, no xanthalasma, EOMS intact, no nystagmus        Lymph:No Cervical lymphadenopathy present     ENT:  no pallor or cyanosis, dentition good        Neck:  carotid pulses are full and equal bilaterally, JVP normal, no carotid bruit        Respiratory:  normal breath sounds, clear to auscultation, normal A-P diameter, normal symmetry, normal respiratory excursion, no use of accessory muscles         Cardiac: regular rhythm, normal S1/S2, no S3 or S4, apical impulse not displaced, no murmurs, gallops or rubs                pulses full and equal, no bruits auscultated                                        GI:  abdomen soft, non-tender, BS normoactive, no mass, no HSM, no bruits        Extremities and Muscular Skeletal:  no deformities, clubbing, cyanosis, erythema observed              Neurological:  no gross motor deficits        Psych:  Alert and Oriented x 3        Recent Lab Results:  LIPID RESULTS:  Lab Results   Component Value Date    CHOL 222 (H) 04/27/2018    HDL 33 (L) 04/27/2018     (H) 04/27/2018    TRIG 107 04/27/2018    CHOLHDLRATIO 4.4 08/23/2002       LIVER ENZYME RESULTS:  Lab Results   Component Value Date    AST 24 04/22/2024    AST 14 04/27/2018    ALT 44 04/22/2024    ALT 34 04/27/2018       CBC RESULTS:  Lab Results   Component Value Date    WBC 6.6 04/22/2024    WBC 6.2 04/27/2018  "   RBC 4.81 04/22/2024    RBC 4.97 04/27/2018    HGB 15.6 04/22/2024    HGB 15.6 04/27/2018    HCT 44.3 04/22/2024    HCT 45.1 04/27/2018    MCV 92 04/22/2024    MCV 91 04/27/2018    MCH 32.4 04/22/2024    MCH 31.4 04/27/2018    MCHC 35.2 04/22/2024    MCHC 34.6 04/27/2018    RDW 13.2 04/22/2024    RDW 12.8 04/27/2018     04/22/2024     04/27/2018       BMP RESULTS:  Lab Results   Component Value Date     04/22/2024     04/27/2018    POTASSIUM 4.0 04/22/2024    POTASSIUM 4.5 04/27/2018    CHLORIDE 101 04/22/2024    CHLORIDE 107 04/27/2018    CO2 21 (L) 04/22/2024    CO2 23 04/27/2018    ANIONGAP 14 04/22/2024    ANIONGAP 8 04/27/2018    GLC 99 04/22/2024     (H) 04/27/2018    BUN 12.8 04/22/2024    BUN 12 04/27/2018    CR 0.95 04/22/2024    CR 1.01 04/27/2018    GFRESTIMATED >90 04/22/2024    GFRESTIMATED 77 04/27/2018    GFRESTBLACK >90 04/27/2018    LUIS CARLOS 9.2 04/22/2024    LUIS CARLOS 9.2 04/27/2018        A1C RESULTS:  No results found for: \"A1C\"    INR RESULTS:  No results found for: \"INR\"        CC  Ronald Haji MD  [facility]  [address] [place] [place] [place] [address]                 "

## 2024-04-26 NOTE — LETTER
Ongoing SW/CM Assessment/Plan of Care Note     See SW/CM flowsheets for goals and other objective data.    Patient/Family discharge goal (s):  Dc planning home with outpt    PT Recommendation:     Recommendation for Discharge: PT IL: Patient requires  intermittent assistance to perform mobility and/or ADLs safely    OT Recommendation:     Recommendations for Discharge: OT IL: Patient is appropriate for daily Occupational Therapy    SLP Recommendation:       Disposition:       Progress note:   SW spoke with pt, aware of dc date and plan is home with girlfriend. Team is recommending outpt and will provide options. L/m for girlfriend.          4/26/2024    Physician No Ref-Primary  No address on file    RE: Miguel Ruiz       Dear Colleague,     I had the pleasure of seeing Miguel Ruiz in the [facility].  HPI and Plan:   Pleasure to see this 63-year-old gentleman accompanied by his wife.  He was recently discharged from the ER with chest discomfort and was told to follow-up with a cardiologist    He is not diabetic hypertensive not as he smoke drink or abuse drugs.  Family history is notable for father having coronary artery disease in his 60s though I reassured them that this does not technically constitute a positive family history.    Since beginning winter he has had chest discomfort.  It is a constant sensation in his chest with an occasional popping feeling.  It is not worse on exertion.  He does feel more winded and occasionally dizzy.  No nausea or vomiting associate with this symptom.    Several days ago he experienced profound fatigue after working in the yard for 5 hours.  Does not workout on a regular basis.    Cardiac examination is normal.  Excellent blood pressure.    Review of the test done in the emergency room from just few days ago.    Troponins x 2 were completely negative.  EKG shows fractionated QRS in lead 3 but overall within normal limits  Other labs including chest x-ray also normal.    This is a gentleman with mild cardiac risk factors and atypical symptoms.  Chest pain is probably noncardiac.  He admits to being under a lot of stress at this time both in his professional and personal life.    Will check stress echocardiogram.  If this is normal I do not think further cardiac workup will be necessary.    Orders Placed This Encounter   Procedures    Exercise Stress Echocardiogram       Orders Placed This Encounter   Medications    Multiple Vitamin (MULTIVITAMIN ADULT PO)    Omega-3 Fatty Acids (FISH OIL PO)     Sig: Take a double dose daily       Encounter Diagnosis   Name Primary?    Atherosclerosis of  native coronary artery of native heart with stable angina pectoris (H24)        CURRENT MEDICATIONS:  Current Outpatient Medications   Medication Sig Dispense Refill    Multiple Vitamin (MULTIVITAMIN ADULT PO)       Omega-3 Fatty Acids (FISH OIL PO) Take a double dose daily      Omeprazole (PRILOSEC PO) Take by mouth every morning OTC         ALLERGIES     Allergies   Allergen Reactions    No Known Allergies        PAST MEDICAL HISTORY:  Past Medical History:   Diagnosis Date    Esophageal reflux     Mixed hyperlipidemia     Obesity, unspecified        PAST SURGICAL HISTORY:  Past Surgical History:   Procedure Laterality Date    GI SURGERY      HC RECONSTR NOSE+MARCELA SEPTAL REPAIR      HEMICOLECTOMY, RT/LT Left 2006    neck fusion   1996    ZZC AFF NECK/CHEST PROCEDURE UNLISTED  1997    Neck fused       FAMILY HISTORY:  Family History   Problem Relation Age of Onset    Ovarian Cancer Mother     Heart Disease Father         MI at age 65    Esophageal Cancer Father     Cancer Sister         pancreas at age 35    Cancer Paternal Aunt         breast    Diabetes Paternal Aunt     Diabetes Maternal Aunt     Diabetes Maternal Uncle     Heart Disease Paternal Uncle         multible uncles    Alzheimer Disease No family hx of        SOCIAL HISTORY:  Social History     Socioeconomic History    Marital status:      Spouse name: Latia    Number of children: 5    Years of education: 16    Highest education level: None   Occupational History    Occupation: Sales     Comment: Self    [employer]   Tobacco Use    Smoking status: Never    Smokeless tobacco: Never   Substance and Sexual Activity    Alcohol use: Yes     Alcohol/week: 0.8 standard drinks of alcohol     Comment: Occasional    Drug use: No    Sexual activity: Yes     Partners: Female   Other Topics Concern    Exercise Yes    Seat Belt Yes    Self-Exams No       Review of Systems:  Skin:  not assessed     Eyes:  not assessed    ENT:  not assessed    Respiratory:  Negative     Cardiovascular:    Positive for;chest pain;fatigue;lightheadedness  Gastroenterology: not assessed    Genitourinary:  not assessed    Musculoskeletal:  not assessed    Neurologic:  not assessed    Psychiatric:  not assessed    Heme/Lymph/Imm:  not assessed    Endocrine:  not assessed      Physical Exam:  Vitals: /84 (BP Location: Right arm, Patient Position: Sitting, Cuff Size: Adult Large)   Pulse 72   Wt 108.2 kg (238 lb 9.6 oz)   SpO2 97%   BMI 33.28 kg/m      Constitutional:  cooperative, alert and oriented, well developed, well nourished, in no acute distress        Skin:  warm and dry to the touch, no apparent skin lesions or masses noted          Head:  normocephalic, no masses or lesions        Eyes:  pupils equal and round, conjunctivae and lids unremarkable, sclera white, no xanthalasma, EOMS intact, no nystagmus        Lymph:No Cervical lymphadenopathy present     ENT:  no pallor or cyanosis, dentition good        Neck:  carotid pulses are full and equal bilaterally, JVP normal, no carotid bruit        Respiratory:  normal breath sounds, clear to auscultation, normal A-P diameter, normal symmetry, normal respiratory excursion, no use of accessory muscles         Cardiac: regular rhythm, normal S1/S2, no S3 or S4, apical impulse not displaced, no murmurs, gallops or rubs                pulses full and equal, no bruits auscultated                                        GI:  abdomen soft, non-tender, BS normoactive, no mass, no HSM, no bruits        Extremities and Muscular Skeletal:  no deformities, clubbing, cyanosis, erythema observed              Neurological:  no gross motor deficits        Psych:  Alert and Oriented x 3        Recent Lab Results:  LIPID RESULTS:  Lab Results   Component Value Date    CHOL 222 (H) 04/27/2018    HDL 33 (L) 04/27/2018     (H) 04/27/2018    TRIG 107 04/27/2018    CHOLHDLRATIO 4.4 08/23/2002       LIVER ENZYME RESULTS:  Lab Results   Component Value Date  "   AST 24 04/22/2024    AST 14 04/27/2018    ALT 44 04/22/2024    ALT 34 04/27/2018       CBC RESULTS:  Lab Results   Component Value Date    WBC 6.6 04/22/2024    WBC 6.2 04/27/2018    RBC 4.81 04/22/2024    RBC 4.97 04/27/2018    HGB 15.6 04/22/2024    HGB 15.6 04/27/2018    HCT 44.3 04/22/2024    HCT 45.1 04/27/2018    MCV 92 04/22/2024    MCV 91 04/27/2018    MCH 32.4 04/22/2024    MCH 31.4 04/27/2018    MCHC 35.2 04/22/2024    MCHC 34.6 04/27/2018    RDW 13.2 04/22/2024    RDW 12.8 04/27/2018     04/22/2024     04/27/2018       BMP RESULTS:  Lab Results   Component Value Date     04/22/2024     04/27/2018    POTASSIUM 4.0 04/22/2024    POTASSIUM 4.5 04/27/2018    CHLORIDE 101 04/22/2024    CHLORIDE 107 04/27/2018    CO2 21 (L) 04/22/2024    CO2 23 04/27/2018    ANIONGAP 14 04/22/2024    ANIONGAP 8 04/27/2018    GLC 99 04/22/2024     (H) 04/27/2018    BUN 12.8 04/22/2024    BUN 12 04/27/2018    CR 0.95 04/22/2024    CR 1.01 04/27/2018    GFRESTIMATED >90 04/22/2024    GFRESTIMATED 77 04/27/2018    GFRESTBLACK >90 04/27/2018    LUIS CARLOS 9.2 04/22/2024    LUIS CARLOS 9.2 04/27/2018        A1C RESULTS:  No results found for: \"A1C\"    INR RESULTS:  No results found for: \"INR\"        CC  Lake Haji MD  [facility]  [address] [place] [address]          Thank you for allowing me to participate in the care of your patient.      Sincerely,     DR LAKE VALLEJO MD     [facility]  "

## 2024-05-13 ENCOUNTER — TELEPHONE (OUTPATIENT)
Dept: CARDIOLOGY | Facility: CLINIC | Age: 64
End: 2024-05-13
Payer: COMMERCIAL

## 2024-05-13 DIAGNOSIS — I25.118 ATHEROSCLEROSIS OF NATIVE CORONARY ARTERY OF NATIVE HEART WITH STABLE ANGINA PECTORIS (H): Primary | ICD-10-CM

## 2024-05-13 NOTE — TELEPHONE ENCOUNTER
"Team received information that the Stress Echocardiogram is not covered by insurance.   Dr. Hollins has requested the patient have an exercise stress test instead.    \"Gurvinder, Ronald Victoria MD  P [facility] 3  Pls order stress EKG test for him.  Many thanks.\"    Orders entered.  Writer called and spoke with Migeul, explained above.   He is more than happy to do the treadmill test and then go forward.   Informed Miguel that our  team will be calling him to get that scheduled.   Instructed him to call us with any symptoms\questions.    Rasheeda Askew RN on 5/13/2024 at 1:49 PM        "

## 2024-05-15 ENCOUNTER — HOSPITAL ENCOUNTER (OUTPATIENT)
Dept: CARDIOLOGY | Facility: CLINIC | Age: 64
Discharge: HOME | End: 2024-05-15
Attending: INTERNAL MEDICINE | Admitting: INTERNAL MEDICINE
Payer: COMMERCIAL

## 2024-05-15 DIAGNOSIS — I25.118 ATHEROSCLEROSIS OF NATIVE CORONARY ARTERY OF NATIVE HEART WITH STABLE ANGINA PECTORIS (H): ICD-10-CM

## 2024-05-15 PROCEDURE — 93016 CV STRESS TEST SUPVJ ONLY: CPT | Performed by: INTERNAL MEDICINE

## 2024-05-15 PROCEDURE — 93017 CV STRESS TEST TRACING ONLY: CPT

## 2024-05-15 PROCEDURE — 93018 CV STRESS TEST I&R ONLY: CPT | Performed by: INTERNAL MEDICINE

## 2024-05-16 ENCOUNTER — TELEPHONE (OUTPATIENT)
Dept: CARDIOLOGY | Facility: CLINIC | Age: 64
End: 2024-05-16
Payer: COMMERCIAL

## 2024-05-16 NOTE — TELEPHONE ENCOUNTER
"Team received instructions from Dr. Hollins.  He has reviewed the treadmill stress test results.   He said that the patient's symptoms are not heart related and he may be discharged from the heart clinic.    Writer called and spoke with Miguel.  Informed him that Dr. Hollins has reviewed his test.  \"pls let him know.  CP non cardiac and discharge from clinic.  Thanks. \"    The Duke treadmill score was low risk ( >5 Duke score).  Normal stress EKG test.   The patient exhibited no chest pain during exercise.  Target Heart Rate was achieved.  There was a normal BP response to exercise.   No arrhythmia noted.  There were no ST segment changes observed with stress.    Miguel states he is in the process of getting PCP, we discussed the [facility] as a good option.  Miguel expressed relief and thanked writer for the good news.    Rasheeda Askew RN on 5/16/2024 at 3:13 PM       "

## 2024-10-01 ENCOUNTER — OFFICE VISIT (OUTPATIENT)
Dept: INTERNAL MEDICINE | Facility: CLINIC | Age: 64
End: 2024-10-01
Payer: COMMERCIAL

## 2024-10-01 VITALS
WEIGHT: 236.3 LBS | OXYGEN SATURATION: 96 % | SYSTOLIC BLOOD PRESSURE: 133 MMHG | BODY MASS INDEX: 33.08 KG/M2 | HEIGHT: 71 IN | TEMPERATURE: 98.1 F | DIASTOLIC BLOOD PRESSURE: 76 MMHG | RESPIRATION RATE: 18 BRPM | HEART RATE: 80 BPM

## 2024-10-01 DIAGNOSIS — M16.11 OSTEOARTHRITIS OF RIGHT HIP, UNSPECIFIED OSTEOARTHRITIS TYPE: ICD-10-CM

## 2024-10-01 DIAGNOSIS — K04.7 DENTAL INFECTION: ICD-10-CM

## 2024-10-01 DIAGNOSIS — Z01.818 PREOPERATIVE EXAMINATION: Primary | ICD-10-CM

## 2024-10-01 DIAGNOSIS — K21.9 GASTROESOPHAGEAL REFLUX DISEASE WITHOUT ESOPHAGITIS: ICD-10-CM

## 2024-10-01 DIAGNOSIS — R73.9 ELEVATED BLOOD SUGAR: ICD-10-CM

## 2024-10-01 LAB
BASOPHILS # BLD AUTO: 0 10E3/UL (ref 0–0.2)
BASOPHILS NFR BLD AUTO: 0 %
EOSINOPHIL # BLD AUTO: 0.2 10E3/UL (ref 0–0.7)
EOSINOPHIL NFR BLD AUTO: 3 %
ERYTHROCYTE [DISTWIDTH] IN BLOOD BY AUTOMATED COUNT: 12.5 % (ref 10–15)
HCT VFR BLD AUTO: 44.2 % (ref 40–53)
HGB BLD-MCNC: 16.1 G/DL (ref 13.3–17.7)
IMM GRANULOCYTES # BLD: 0 10E3/UL
IMM GRANULOCYTES NFR BLD: 0 %
LYMPHOCYTES # BLD AUTO: 2.1 10E3/UL (ref 0.8–5.3)
LYMPHOCYTES NFR BLD AUTO: 28 %
MCH RBC QN AUTO: 32.7 PG (ref 26.5–33)
MCHC RBC AUTO-ENTMCNC: 36.4 G/DL (ref 31.5–36.5)
MCV RBC AUTO: 90 FL (ref 78–100)
MONOCYTES # BLD AUTO: 0.6 10E3/UL (ref 0–1.3)
MONOCYTES NFR BLD AUTO: 8 %
NEUTROPHILS # BLD AUTO: 4.4 10E3/UL (ref 1.6–8.3)
NEUTROPHILS NFR BLD AUTO: 60 %
PLATELET # BLD AUTO: 265 10E3/UL (ref 150–450)
RBC # BLD AUTO: 4.92 10E6/UL (ref 4.4–5.9)
WBC # BLD AUTO: 7.3 10E3/UL (ref 4–11)

## 2024-10-01 PROCEDURE — 80053 COMPREHEN METABOLIC PANEL: CPT | Performed by: NURSE PRACTITIONER

## 2024-10-01 PROCEDURE — 85025 COMPLETE CBC W/AUTO DIFF WBC: CPT | Performed by: NURSE PRACTITIONER

## 2024-10-01 PROCEDURE — 99203 OFFICE O/P NEW LOW 30 MIN: CPT | Performed by: NURSE PRACTITIONER

## 2024-10-01 PROCEDURE — 83036 HEMOGLOBIN GLYCOSYLATED A1C: CPT | Performed by: NURSE PRACTITIONER

## 2024-10-01 PROCEDURE — 36415 COLL VENOUS BLD VENIPUNCTURE: CPT | Performed by: NURSE PRACTITIONER

## 2024-10-01 RX ORDER — PENICILLIN V POTASSIUM 500 MG/1
1000 TABLET, FILM COATED ORAL 2 TIMES DAILY
Qty: 28 TABLET | Refills: 0 | Status: SHIPPED | OUTPATIENT
Start: 2024-10-01 | End: 2024-10-08

## 2024-10-01 RX ORDER — CHLORHEXIDINE GLUCONATE ORAL RINSE 1.2 MG/ML
15 SOLUTION DENTAL 2 TIMES DAILY
Qty: 473 ML | Refills: 0 | Status: SHIPPED | OUTPATIENT
Start: 2024-10-01

## 2024-10-01 RX ORDER — AMOXICILLIN 500 MG/1
500 CAPSULE ORAL 2 TIMES DAILY
Qty: 30 CAPSULE | Refills: 0 | Status: CANCELLED | OUTPATIENT
Start: 2024-10-01

## 2024-10-01 ASSESSMENT — ENCOUNTER SYMPTOMS
CARDIOVASCULAR NEGATIVE: 1
ARTHRALGIAS: 1
RESPIRATORY NEGATIVE: 1
CHILLS: 0
GASTROINTESTINAL NEGATIVE: 1
FEVER: 0

## 2024-10-01 ASSESSMENT — PAIN SCALES - GENERAL: PAINLEVEL: NO PAIN (0)

## 2024-10-01 NOTE — PROGRESS NOTES
Preoperative Evaluation  [facility]  [address]  [address]  [facility] [address]  Phone: [phone]  Primary Provider: Physician No Ref-Primary  Pre-op Performing Provider: Cheryl Rosa CNP  Oct 1, 2024             10/1/2024   Surgical Information   What procedure is being done? right hip replacement   Facility or Hospital where procedure/surgery will be performed: surgery center   Who is doing the procedure / surgery? Dr Gustafson   Date of surgery / procedure: October 23 2024   Time of surgery / procedure: tbd   Where do you plan to recover after surgery? at home with family      Fax number for surgical facility: [phone]    Assessment & Plan     The proposed surgical procedure is considered INTERMEDIATE risk.    Preoperative examination  -low risk, medically optimized   - CBC with platelets and differential; Future  - Comprehensive metabolic panel (BMP + Alb, Alk Phos, ALT, AST, Total. Bili, TP); Future  - CBC with platelets and differential  - Comprehensive metabolic panel (BMP + Alb, Alk Phos, ALT, AST, Total. Bili, TP)    Osteoarthritis, right hip     Gastroesophageal reflux disease without esophagitis  -stable on Prilosec     - CBC with platelets and differential; Future  - Comprehensive metabolic panel (BMP + Alb, Alk Phos, ALT, AST, Total. Bili, TP); Future  - CBC with platelets and differential  - Comprehensive metabolic panel (BMP + Alb, Alk Phos, ALT, AST, Total. Bili, TP)    Dental infection  -has been working with dentist, will send in peridex and penicillin -patient will check with ortho     - chlorhexidine (PERIDEX) 0.12 % solution; Swish and spit 15 mLs in mouth 2 times daily.  - penicillin V (VEETID) 500 MG tablet; Take 2 tablets (1,000 mg) by mouth 2 times daily for 7 days.    Elevated blood sugar  -A1c in prediabetic range   - Hemoglobin A1c; Future  - Hemoglobin A1c       - No identified additional risk factors other than previously addressed          Recommendation  Approval given to proceed with proposed procedure, without further diagnostic evaluation.    Subjective   Miguel is a 63 year old, presenting for the following:  Pre-Op Exam (Total Rt hip)          10/1/2024     9:39 AM   Additional Questions   Roomed by Vi Back   Accompanied by self         10/1/2024     9:39 AM   Patient Reported Additional Medications   Patient reports taking the following new medications no     HPI related to upcoming procedure:     Hx of chronic right hip pain.  Hx steroid injection with improvement but now pain is different.      Hx dental problems with several recent root canals.  Now with some tenderness on left side.          10/1/2024   Pre-Op Questionnaire   Have you ever had a heart attack or stroke? No   Have you ever had surgery on your heart or blood vessels, such as a stent placement, a coronary artery bypass, or surgery on an artery in your head, neck, heart, or legs? No   Do you have chest pain with activity? No   Do you have a history of heart failure? No   Do you currently have a cold, bronchitis or symptoms of other infection? (!) UNKNOWN -no current symptoms    Do you have a cough, shortness of breath, or wheezing? No   Do you or anyone in your family have previous history of blood clots? No   Do you or does anyone in your family have a serious bleeding problem such as prolonged bleeding following surgeries or cuts? No   Have you ever had problems with anemia or been told to take iron pills? No   Have you had any abnormal blood loss such as black, tarry or bloody stools? No   Have you ever had a blood transfusion? No   Are you willing to have a blood transfusion if it is medically needed before, during, or after your surgery? (!) NO    Have you or any of your relatives ever had problems with anesthesia? (!) YES -hx of out of control, after surgery and having to get extra medication    Do you have sleep apnea, excessive snoring or daytime drowsiness? No    Do you have any artifical heart valves or other implanted medical devices like a pacemaker, defibrillator, or continuous glucose monitor? No   Do you have artificial joints? No   Are you allergic to latex? No        Health Care Directive  Patient does not have a Health Care Directive or Living Will: Discussed advance care planning with patient; however, patient declined at this time.    Preoperative Review of    reviewed - no record of controlled substances prescribed.          Patient Active Problem List    Diagnosis Date Noted    Encounter for long-term current use of medication 08/22/2002     Priority: Medium     Problem list name updated by automated process. Provider to review      Mixed hyperlipidemia      Priority: Medium    Obesity      Priority: Medium     Problem list name updated by automated process. Provider to review      Esophageal reflux      Priority: Medium      Past Medical History:   Diagnosis Date    Esophageal reflux     Mixed hyperlipidemia     Obesity, unspecified      Past Surgical History:   Procedure Laterality Date    GI SURGERY      HC RECONSTR NOSE+MARCELA SEPTAL REPAIR      HEMICOLECTOMY, RT/LT Left 2006    IR LUMBAR PUNCTURE  7/15/2024    neck fusion   1996    Z AFF NECK/CHEST PROCEDURE UNLISTED  1997    Neck fused     Current Outpatient Medications   Medication Sig Dispense Refill    chlorhexidine (PERIDEX) 0.12 % solution Swish and spit 15 mLs in mouth 2 times daily. 473 mL 0    Multiple Vitamin (MULTIVITAMIN ADULT PO)       Omega-3 Fatty Acids (FISH OIL PO) Take a double dose daily      Omeprazole (PRILOSEC PO) Take by mouth every morning OTC      penicillin V (VEETID) 500 MG tablet Take 2 tablets (1,000 mg) by mouth 2 times daily for 7 days. 28 tablet 0       Allergies   Allergen Reactions    No Known Allergies         Social History     Tobacco Use    Smoking status: Never    Smokeless tobacco: Never   Substance Use Topics    Alcohol use: Yes     Alcohol/week: 0.8 standard  "drinks of alcohol     Comment: Occasional       History   Drug Use No           Review of Systems   Constitutional:  Negative for chills and fever.   HENT: Negative.     Respiratory: Negative.     Cardiovascular: Negative.    Gastrointestinal: Negative.    Musculoskeletal:  Positive for arthralgias (right hip pain).     Objective    /76 (BP Location: Left arm, Patient Position: Sitting, Cuff Size: Adult Large)   Pulse 80   Temp 98.1  F (36.7  C) (Oral)   Resp 18   Ht 1.803 m (5' 11\")   Wt 107.2 kg (236 lb 4.8 oz)   SpO2 96%   BMI 32.96 kg/m     Estimated body mass index is 32.96 kg/m  as calculated from the following:    Height as of this encounter: 1.803 m (5' 11\").    Weight as of this encounter: 107.2 kg (236 lb 4.8 oz).  Physical Exam  Vitals and nursing note reviewed.   Constitutional:       General: He is not in acute distress.     Appearance: Normal appearance. He is not ill-appearing.   HENT:      Head: Normocephalic and atraumatic.      Right Ear: Tympanic membrane, ear canal and external ear normal.      Left Ear: Tympanic membrane, ear canal and external ear normal.      Nose: Nose normal.      Mouth/Throat:      Mouth: Mucous membranes are moist.      Pharynx: Oropharynx is clear.   Eyes:      Extraocular Movements: Extraocular movements intact.      Conjunctiva/sclera: Conjunctivae normal.      Pupils: Pupils are equal, round, and reactive to light.   Cardiovascular:      Rate and Rhythm: Normal rate and regular rhythm.      Pulses: Normal pulses.      Heart sounds: Normal heart sounds.   Pulmonary:      Effort: Pulmonary effort is normal.      Breath sounds: Normal breath sounds.   Abdominal:      General: Bowel sounds are normal. There is no distension.      Palpations: Abdomen is soft. There is no mass.      Tenderness: There is no abdominal tenderness. There is no guarding.   Musculoskeletal:         General: Normal range of motion.      Cervical back: Normal range of motion.   Skin:    "  General: Skin is warm and dry.   Neurological:      General: No focal deficit present.      Mental Status: He is alert and oriented to person, place, and time. Mental status is at baseline.   Psychiatric:         Mood and Affect: Mood normal.         Behavior: Behavior normal.       Recent Labs   Lab Test 04/22/24  1456   HGB 15.6         POTASSIUM 4.0   CR 0.95      Diagnostics  Recent Results (from the past 168 hour(s))   Comprehensive metabolic panel (BMP + Alb, Alk Phos, ALT, AST, Total. Bili, TP)    Collection Time: 10/01/24 10:25 AM   Result Value Ref Range    Sodium 135 135 - 145 mmol/L    Potassium 4.0 3.4 - 5.3 mmol/L    Carbon Dioxide (CO2) 22 22 - 29 mmol/L    Anion Gap 11 7 - 15 mmol/L    Urea Nitrogen 14.4 8.0 - 23.0 mg/dL    Creatinine 1.00 0.67 - 1.17 mg/dL    GFR Estimate 86 >60 mL/min/1.73m2    Calcium 9.0 8.8 - 10.4 mg/dL    Chloride 102 98 - 107 mmol/L    Glucose 128 (H) 70 - 99 mg/dL    Alkaline Phosphatase 56 40 - 150 U/L    AST 22 0 - 45 U/L    ALT 36 0 - 70 U/L    Protein Total 7.4 6.4 - 8.3 g/dL    Albumin 4.5 3.5 - 5.2 g/dL    Bilirubin Total 0.5 <=1.2 mg/dL   CBC with platelets and differential    Collection Time: 10/01/24 10:25 AM   Result Value Ref Range    WBC Count 7.3 4.0 - 11.0 10e3/uL    RBC Count 4.92 4.40 - 5.90 10e6/uL    Hemoglobin 16.1 13.3 - 17.7 g/dL    Hematocrit 44.2 40.0 - 53.0 %    MCV 90 78 - 100 fL    MCH 32.7 26.5 - 33.0 pg    MCHC 36.4 31.5 - 36.5 g/dL    RDW 12.5 10.0 - 15.0 %    Platelet Count 265 150 - 450 10e3/uL    % Neutrophils 60 %    % Lymphocytes 28 %    % Monocytes 8 %    % Eosinophils 3 %    % Basophils 0 %    % Immature Granulocytes 0 %    Absolute Neutrophils 4.4 1.6 - 8.3 10e3/uL    Absolute Lymphocytes 2.1 0.8 - 5.3 10e3/uL    Absolute Monocytes 0.6 0.0 - 1.3 10e3/uL    Absolute Eosinophils 0.2 0.0 - 0.7 10e3/uL    Absolute Basophils 0.0 0.0 - 0.2 10e3/uL    Absolute Immature Granulocytes 0.0 <=0.4 10e3/uL   Hemoglobin A1c    Collection  Time: 10/01/24 10:25 AM   Result Value Ref Range    Estimated Average Glucose 126 (H) <117 mg/dL    Hemoglobin A1C 6.0 (H) 0.0 - 5.6 %      Last EKG 04/22/2024  Stress test negative 05/15/2024    Revised Cardiac Risk Index (RCRI)  The patient has the following serious cardiovascular risks for perioperative complications:   - No serious cardiac risks = 0 points     RCRI Interpretation: 0 points: Class I (very low risk - 0.4% complication rate)         Signed Electronically by: Cheryl Rosa CNP  A copy of this evaluation report is provided to the requesting physician.

## 2024-10-02 LAB
ALBUMIN SERPL BCG-MCNC: 4.5 G/DL (ref 3.5–5.2)
ALP SERPL-CCNC: 56 U/L (ref 40–150)
ALT SERPL W P-5'-P-CCNC: 36 U/L (ref 0–70)
ANION GAP SERPL CALCULATED.3IONS-SCNC: 11 MMOL/L (ref 7–15)
AST SERPL W P-5'-P-CCNC: 22 U/L (ref 0–45)
BILIRUB SERPL-MCNC: 0.5 MG/DL
BUN SERPL-MCNC: 14.4 MG/DL (ref 8–23)
CALCIUM SERPL-MCNC: 9 MG/DL (ref 8.8–10.4)
CHLORIDE SERPL-SCNC: 102 MMOL/L (ref 98–107)
CREAT SERPL-MCNC: 1 MG/DL (ref 0.67–1.17)
EGFRCR SERPLBLD CKD-EPI 2021: 86 ML/MIN/1.73M2
EST. AVERAGE GLUCOSE BLD GHB EST-MCNC: 126 MG/DL
GLUCOSE SERPL-MCNC: 128 MG/DL (ref 70–99)
HBA1C MFR BLD: 6 % (ref 0–5.6)
HCO3 SERPL-SCNC: 22 MMOL/L (ref 22–29)
POTASSIUM SERPL-SCNC: 4 MMOL/L (ref 3.4–5.3)
PROT SERPL-MCNC: 7.4 G/DL (ref 6.4–8.3)
SODIUM SERPL-SCNC: 135 MMOL/L (ref 135–145)

## 2024-10-08 ENCOUNTER — NURSE TRIAGE (OUTPATIENT)
Dept: INTERNAL MEDICINE | Facility: CLINIC | Age: 64
End: 2024-10-08
Payer: COMMERCIAL

## 2024-10-08 NOTE — TELEPHONE ENCOUNTER
"Nurse Triage SBAR    Is this a 2nd Level Triage? YES, LICENSED PRACTITIONER REVIEW IS REQUIRED    Situation: rash from penicillin - Patient calling to let us know of reaction to add this to his chart.     Background:   Pre-op on 10/1  Hx dental problems with several recent root canals. Now with some tenderness on left side   penicillin V (VEETID) 500 MG tablet 28 tablet 0 10/1/2024 10/8/2024 No   Sig - Route: Take 2 tablets (1,000 mg) by mouth 2 times daily for 7 days. - Oral   Sent to pharmacy as: Penicillin V Potassium 500 MG Oral Tablet (VEETID)   Class: E-Prescribe     Assessment:   Started 2-3 days after starting penicillin. Rash in bilateral armpits - started 3-4 days ago. He didn't pay any attention to it, blew it off at first. He is color blind.   Per wife - Heavy rash in patches - 1/2 inch size circles 4-6 of them. Raised and bright red. Extends slightly past armpit area. \"Little itchy\"   Patient is out of [facility]. Will be back in town on Thursday.   He is stopping the penicillin, has one dose left tonight that he will not take.     Protocol Recommended Disposition:   See Today in Office  Hives or itching  R/O: allergic drug rash    Recommendation: Routing to provider to review and advise. Is office visit needed? Any further recommendations?   Care advice given to patient - can take benedryl, or other antihistamine for itching.       JENNA LIZAMA RN on 10/8/2024 at 2:38 PM   [facility]         Does the patient meet one of the following criteria for ADS visit consideration? 16+ years old, no PCP (internal or external) but seen at [facility]     TIP  Providers, please consider if this condition is appropriate for management at one of our Acute and Diagnostic Services sites.     If patient is a good candidate, please use dotphrase <dot>triageresponse and select Refer to ADS to document.     Patient stated an understanding and agreed with plan.     JENNA " "DL RN on 10/8/2024 at 1:26 PM   [facility]         One dose left, not going to take    More difficulty breathing at 7/10 - no flu - annoyance - chest burns. To the doctor - sob - would say no.         Additional Information   Negative: Difficulty breathing or wheezing   Negative: Hoarseness or cough that started soon after 1st dose of drug   Negative: Swollen tongue that started soon after first dose of drug   Negative: Fever and purple or blood-colored spots or dots   Negative: Too weak or sick to stand   Negative: Sounds like a life-threatening emergency to the triager   Negative: Rash is only on 1 part of the body (localized)   Negative: Taking new non-prescription (OTC) antihistamine, decongestant, ear drops, eye drops, or other OTC cough/cold medicine   Negative: Taking new prescription antihistamine, allergy medicine, asthma medicine, eye drops, ear drops or nose drops   Negative: Rash started more than 3 days after stopping new prescription medicine   Negative: Swollen tongue   Negative: Widespread hives and onset < 2 hours of exposure to 1st dose of drug   Negative: Patient sounds very sick or weak to the triager   Negative: Fever   Negative: Face or lip swelling   Negative: Purple or blood-colored spots or dots (no fever and sounds well to triager)   Negative: Joint pain or swelling   Negative: Bloody crusts on lips or in mouth   Negative: Large or small blisters on skin (i.e., fluid filled bubbles or sacs)   Negative: Pregnant   Negative: Rash beginning within 4 hours of a new prescription medication   Hives or itching    Answer Assessment - Initial Assessment Questions  1. APPEARANCE of RASH: \"Describe the rash.\" (e.g., spots, blisters, raised areas, skin peeling, scaly)      Heavy rash in patches - 1/2 in size circles 4-6 of them. Raised and bright red. Extends slightly past.     2. SIZE: \"How big are the spots?\" (e.g., tip of pen, eraser, coin; inches, centimeters)      Dark " "    3. LOCATION: \"Where is the rash located?\"      Under both armpits    4. COLOR: \"What color is the rash?\" (Note: It is difficult to assess rash color in people with darker-colored skin. When this situation occurs, simply ask the caller to describe what they see.)      Bright red    5. ONSET: \"When did the rash begin?\"      3-4 days ago, he ignored it. Little itchy. Chest feels hot, not rashy. More internal.     6. FEVER: \"Do you have a fever?\" If so, ask: \"What is your temperature, how was it measured, and when did it start?\"      no    7. ITCHING: \"Does the rash itch?\" If so, ask: \"How bad is the itch?\" (Scale 1-10; or mild, moderate, severe)      Little bit.     8. CAUSE: \"What do you think is causing the rash?\"      Reaction to penicillin -     9. NEW MEDICATION: \"What new medication are you taking?\" (e.g., name of antibiotic) \"When did you start taking this medication?\".      Prescribed for dental - healing sore, tender, not infection type sore.     10. OTHER SYMPTOMS: \"Do you have any other symptoms?\" (e.g., sore throat, fever, joint pain)        Gas buildup, stomach tightness, took gas-x.     11. PREGNANCY: \"Is there any chance you are pregnant?\" \"When was your last menstrual period?\"        N/a    Protocols used: Rash - Widespread on Drugs - Drug [id number]-A-HX    "

## 2024-10-09 NOTE — TELEPHONE ENCOUNTER
Agree with stopping medication, can try antihistamine.  If continues to worsen or doesn't get better then he should be evaluated.

## 2024-10-09 NOTE — TELEPHONE ENCOUNTER
"Patient advised.  Rash in armpits is about the same, maybe slightly better and has not spread.  He is not having significant itching or pain from rash.  No swelling of face, lips or tongue, no difficulty swallowing.  States he is just taking a \"natural antihistamine\" which seems to help.  He will call with any changes or concerns. ROE Grant R.N.    "

## 2025-06-16 ENCOUNTER — OFFICE VISIT (OUTPATIENT)
Dept: DERMATOLOGY | Facility: CLINIC | Age: 65
End: 2025-06-16
Payer: COMMERCIAL

## 2025-06-16 DIAGNOSIS — L57.8 ACTINIC SKIN DAMAGE: ICD-10-CM

## 2025-06-16 DIAGNOSIS — L71.9 ROSACEA: ICD-10-CM

## 2025-06-16 DIAGNOSIS — D22.9 MULTIPLE BENIGN NEVI: ICD-10-CM

## 2025-06-16 DIAGNOSIS — L81.4 LENTIGINES: ICD-10-CM

## 2025-06-16 DIAGNOSIS — R21 RASH AND NONSPECIFIC SKIN ERUPTION: Primary | ICD-10-CM

## 2025-06-16 PROCEDURE — 99204 OFFICE O/P NEW MOD 45 MIN: CPT | Performed by: STUDENT IN AN ORGANIZED HEALTH CARE EDUCATION/TRAINING PROGRAM

## 2025-06-16 RX ORDER — MOMETASONE FUROATE 1 MG/G
CREAM TOPICAL 2 TIMES DAILY
Qty: 45 G | Refills: 4 | Status: SHIPPED | OUTPATIENT
Start: 2025-06-16

## 2025-06-16 NOTE — PATIENT INSTRUCTIONS
Proper skin care from [facility]:    -Eliminate harsh soaps as they strip the natural oils from the skin, often resulting in dry itchy skin ( i.e. Dial, Zest, [place] Spring)  -Use mild soaps such as Cetaphil or Dove Sensitive Skin in the shower. You do not need to use soap on arms, legs, and trunk every time you shower unless visibly soiled.   -Avoid hot or cold showers.  -After showering, lightly dry off and apply moisturizing within 2-3 minutes. This will help trap moisture in the skin.   -Aggressive use of a moisturizer at least 1-2 times a day to the entire body (including -Vanicream, Cetaphil, Aquaphor or Cerave) and moisturize hands after every washing.  -We recommend using moisturizers that come in a tub that needs to be scooped out, not a pump. This has more of an oil base. It will hold moisture in your skin much better than a water base moisturizer. The above recommended are non-pore clogging.      Wear a sunscreen with at least SPF 30 on your face, ears, neck and V of the chest daily. Wear sunscreen on other areas of the body if those areas are exposed to the sun throughout the day. Sunscreens can contain physical and/or chemical blockers. Physical blockers are less likely to clog pores, these include zinc oxide and titanium dioxide. Reapply every two hour and after swimming.     Sunscreen examples: https://www.ewg.org/sunscreen/    UV radiation  UVA radiation remains constant throughout the day and throughout the year. It is a longer wavelength than UVB and therefore penetrates deeper into the skin leading to immediate and delayed tanning, photoaging, and skin cancer. 70-80% of UVA and UVB radiation occurs between the hours of 10am-2pm.  UVB radiation  UVB radiation causes the most harmful effects and is more significant during the summer months. However, snow and ice can reflect UVB radiation leading to skin damage during the winter months as well. UVB radiation is responsible for tanning,  burning, inflammation, delayed erythema (pinkness), pigmentation (brown spots), and skin cancer.     I recommend self monthly full body exams and yearly full body exams with a dermatology provider. If you develop a new or changing lesion please follow up for examination. Most skin cancers are pink and scaly or pink and pearly. However, we do see blue/brown/black skin cancers.  Consider the ABCDEs of melanoma when giving yourself your monthly full body exam ( don't forget the groin, buttocks, feet, toes, etc). A-asymmetry, B-borders, C-color, D-diameter, E-elevation or evolving. If you see any of these changes please follow up in clinic. If you cannot see your back I recommend purchasing a hand held mirror to use with a larger wall mirror.       Checking for Skin Cancer  You can find cancer early by checking your skin each month. There are 3 kinds of skin cancer. They are melanoma, basal cell carcinoma, and squamous cell carcinoma. Doing monthly skin checks is the best way to find new marks or skin changes. Follow the instructions below for checking your skin.   The ABCDEs of checking moles for melanoma   Check your moles or growths for signs of melanoma using ABCDE:   Asymmetry: the sides of the mole or growth don t match  Border: the edges are ragged, notched, or blurred  Color: the color within the mole or growth varies  Diameter: the mole or growth is larger than 6 mm (size of a pencil eraser)  Evolving: the size, shape, or color of the mole or growth is changing (evolving is not shown in the images below)    Checking for other types of skin cancer  Basal cell carcinoma or squamous cell carcinoma have symptoms such as:     A spot or mole that looks different from all other marks on your skin  Changes in how an area feels, such as itching, tenderness, or pain  Changes in the skin's surface, such as oozing, bleeding, or scaliness  A sore that does not heal  New swelling or redness beyond the border of a  mole    Who s at risk?  Anyone can get skin cancer. But you are at greater risk if you have:   Fair skin, light-colored hair, or light-colored eyes  Many moles or abnormal moles on your skin  A history of sunburns from sunlight or tanning beds  A family history of skin cancer  A history of exposure to radiation or chemicals  A weakened immune system  If you have had skin cancer in the past, you are at risk for recurring skin cancer.   How to check your skin  Do your monthly skin checkups in front of a full-length mirror. Check all parts of your body, including your:   Head (ears, face, neck, and scalp)  Torso (front, back, and sides)  Arms (tops, undersides, upper, and lower armpits)  Hands (palms, backs, and fingers, including under the nails)  Buttocks and genitals  Legs (front, back, and sides)  Feet (tops, soles, toes, including under the nails, and between toes)  If you have a lot of moles, take digital photos of them each month. Make sure to take photos both up close and from a distance. These can help you see if any moles change over time.   Most skin changes are not cancer. But if you see any changes in your skin, call your doctor right away. Only he or she can diagnose a problem. If you have skin cancer, seeing your doctor can be the first step toward getting the treatment that could save your life.   [employer] last reviewed this educational content on 4/1/2019 2000-2020 [employer]. [address] [place] [address], [place] [address]. All rights reserved. This information is not intended as a substitute for professional medical care. Always follow your healthcare professional's instructions.       When should I call my doctor?  If you are worsening or not improving, please, contact us or seek urgent care as noted below.     Who should I call with questions (adults)?    [facility] [phone]  For urgent needs outside of business hours call the [facility] at  [phone] and ask for the dermatology resident on call to be paged  If this is a medical emergency and you are unable to reach an ER, Call [phone]      If you need a prescription refill, please contact your pharmacy. Refills are approved or denied by our Physicians during normal business hours, Monday through Friday.  Per office policy, refills will not be granted if you have not been seen within the past year (or sooner depending on the condition).

## 2025-06-16 NOTE — PROGRESS NOTES
[facility] Dermatology Note    Encounter Date: Jun 16, 2025    Dermatology Problem List:    ______________________________________    Impression/Plan:  Miguel was seen today for skin check.    Diagnoses and all orders for this visit:    Rash and nonspecific skin eruption  -     mometasone (ELOCON) 0.1 % external cream; Apply topically 2 times daily. As needed for itching or rash  - likely ICD to anti perspirant  - start mometasone cr BID PRN  - another consideration is inverse pso but this is felt to be less likely     Rosacea  Lentigines  Actinic skin damage  Multiple benign nevi  - Reviewed the compounding benefits of incremental changes to sun protective behaviors including increased frequency of sunscreen and sun protective clothing like broad brimmed hats and longsleeved UPF containing clothing        Follow-up PRN.       Staff Involved:  Staff Only    Olaf Veliz MD   of Dermatology  Department of Dermatology  [facility]      CC:   Chief Complaint   Patient presents with    Skin Check     [facility] [place]  Patient has a list of areas to be checked       History of Present Illness:  Mr. Miguel Ruiz is a 64 year old male who presents as a new patient.    Pt presents today for concerns about spots on trunk and extremities       Labs:      Physical exam:  Vitals: There were no vitals taken for this visit.  GEN: well developed, well-nourished, in no acute distress, in a pleasant mood.     SKIN: Chowdhury phototype 1  - Full skin, which includes the head/face, both arms, chest, back, abdomen,both legs, genitalia and/or groin buttocks, digits and/or nails, was examined.  - Stuck on brown papules on trunk and extremities   - Flat brown macules and patches in a sun exposed areas on face and extremities  - scattered brown papules on trunk and extremities   - red scaly patches in folds of axillsa  - No other lesions of concern on areas examined.     Past  Medical History:   Past Medical History:   Diagnosis Date    Esophageal reflux     Mixed hyperlipidemia     Obesity, unspecified      Past Surgical History:   Procedure Laterality Date    GI SURGERY      [facility] RECONSTR NOSE+MARCELA SEPTAL REPAIR      HEMICOLECTOMY, RT/LT Left 2006    IR LUMBAR PUNCTURE  7/15/2024    neck fusion   1996    [facility] AFF NECK/CHEST PROCEDURE UNLISTED  1997    Neck fused       Social History:   reports that he has never smoked. He has never used smokeless tobacco. He reports current alcohol use of about 0.8 standard drinks of alcohol per week. He reports that he does not use drugs.    Family History:  Family History   Problem Relation Age of Onset    Ovarian Cancer Mother     Heart Disease Father         MI at age 65    Esophageal Cancer Father     Cancer Sister         pancreas at age 35    Cancer Paternal Aunt         breast    Diabetes Paternal Aunt     Diabetes Maternal Aunt     Diabetes Maternal Uncle     Heart Disease Paternal Uncle         multible uncles    Alzheimer Disease No family hx of        Medications:  Current Outpatient Medications   Medication Sig Dispense Refill    mometasone (ELOCON) 0.1 % external cream Apply topically 2 times daily. As needed for itching or rash 45 g 4    Multiple Vitamin (MULTIVITAMIN ADULT PO)       Omega-3 Fatty Acids (FISH OIL PO) Take a double dose daily      Omeprazole (PRILOSEC PO) Take by mouth every morning OTC      chlorhexidine (PERIDEX) 0.12 % solution Swish and spit 15 mLs in mouth 2 times daily. 473 mL 0     Allergies   Allergen Reactions    Penicillin G Itching     Rash and itching in armpits

## 2025-06-16 NOTE — LETTER
6/16/2025      Miguel Ruiz  [address] [place] [address]      Dear Colleague,    Thank you for referring your patient, Miguel Ruiz, to the [facility]. Please see a copy of my visit note below.    [facility] Dermatology Note    Encounter Date: Jun 16, 2025    Dermatology Problem List:    ______________________________________    Impression/Plan:  Miguel was seen today for skin check.    Diagnoses and all orders for this visit:    Rash and nonspecific skin eruption  -     mometasone (ELOCON) 0.1 % external cream; Apply topically 2 times daily. As needed for itching or rash  - likely ICD to anti perspirant  - start mometasone cr BID PRN  - another consideration is inverse pso but this is felt to be less likely     Rosacea  Lentigines  Actinic skin damage  Multiple benign nevi  - Reviewed the compounding benefits of incremental changes to sun protective behaviors including increased frequency of sunscreen and sun protective clothing like broad brimmed hats and longsleeved UPF containing clothing        Follow-up PRN.       Staff Involved:  Staff Only    Olaf Veliz MD   of Dermatology  Department of Dermatology  [facility]      CC:   Chief Complaint   Patient presents with     Skin Check     [facility] [place] a list of areas to be checked       History of Present Illness:  Mr. Miguel Ruiz is a 64 year old male who presents as a new patient.    Pt presents today for concerns about spots on trunk and extremities       Labs:      Physical exam:  Vitals: There were no vitals taken for this visit.  GEN: well developed, well-nourished, in no acute distress, in a pleasant mood.     SKIN: Chowdhury phototype 1  - Full skin, which includes the head/face, both arms, chest, back, abdomen,both legs, genitalia and/or groin buttocks, digits and/or nails, was examined.  - Stuck on brown papules on trunk  and extremities   - Flat brown macules and patches in a sun exposed areas on face and extremities  - scattered brown papules on trunk and extremities   - red scaly patches in folds of axillsa  - No other lesions of concern on areas examined.     Past Medical History:   Past Medical History:   Diagnosis Date     Esophageal reflux      Mixed hyperlipidemia      Obesity, unspecified      Past Surgical History:   Procedure Laterality Date     GI SURGERY       [facility] RECONSTR NOSE+MARCELA SEPTAL REPAIR       HEMICOLECTOMY, RT/LT Left 2006     IR LUMBAR PUNCTURE  7/15/2024     neck fusion   1996     [facility] AFF NECK/CHEST PROCEDURE UNLISTED  1997    Neck fused       Social History:   reports that he has never smoked. He has never used smokeless tobacco. He reports current alcohol use of about 0.8 standard drinks of alcohol per week. He reports that he does not use drugs.    Family History:  Family History   Problem Relation Age of Onset     Ovarian Cancer Mother      Heart Disease Father         MI at age 65     Esophageal Cancer Father      Cancer Sister         pancreas at age 35     Cancer Paternal Aunt         breast     Diabetes Paternal Aunt      Diabetes Maternal Aunt      Diabetes Maternal Uncle      Heart Disease Paternal Uncle         multible uncles     Alzheimer Disease No family hx of        Medications:  Current Outpatient Medications   Medication Sig Dispense Refill     mometasone (ELOCON) 0.1 % external cream Apply topically 2 times daily. As needed for itching or rash 45 g 4     Multiple Vitamin (MULTIVITAMIN ADULT PO)        Omega-3 Fatty Acids (FISH OIL PO) Take a double dose daily       Omeprazole (PRILOSEC PO) Take by mouth every morning OTC       chlorhexidine (PERIDEX) 0.12 % solution Swish and spit 15 mLs in mouth 2 times daily. 473 mL 0     Allergies   Allergen Reactions     Penicillin G Itching     Rash and itching in armpits               Again, thank you for allowing me to participate in the care of your  patient.        Sincerely,        Olaf Veliz MD    Electronically signed